# Patient Record
Sex: FEMALE | Race: BLACK OR AFRICAN AMERICAN | Employment: OTHER | URBAN - METROPOLITAN AREA
[De-identification: names, ages, dates, MRNs, and addresses within clinical notes are randomized per-mention and may not be internally consistent; named-entity substitution may affect disease eponyms.]

---

## 2017-01-25 DIAGNOSIS — R06.02 SOB (SHORTNESS OF BREATH): ICD-10-CM

## 2017-01-25 RX ORDER — SPIRONOLACTONE 25 MG/1
TABLET ORAL
Qty: 90 TAB | Refills: 3 | Status: SHIPPED | OUTPATIENT
Start: 2017-01-25 | End: 2018-02-21 | Stop reason: SDUPTHER

## 2017-03-06 ENCOUNTER — OFFICE VISIT (OUTPATIENT)
Dept: CARDIOLOGY CLINIC | Age: 82
End: 2017-03-06

## 2017-03-06 ENCOUNTER — CLINICAL SUPPORT (OUTPATIENT)
Dept: CARDIOLOGY CLINIC | Age: 82
End: 2017-03-06

## 2017-03-06 VITALS
HEIGHT: 62 IN | HEART RATE: 72 BPM | SYSTOLIC BLOOD PRESSURE: 112 MMHG | DIASTOLIC BLOOD PRESSURE: 70 MMHG | OXYGEN SATURATION: 91 %

## 2017-03-06 DIAGNOSIS — Z95.810 S/P ICD (INTERNAL CARDIAC DEFIBRILLATOR) PROCEDURE: ICD-10-CM

## 2017-03-06 DIAGNOSIS — I42.0 DILATED CARDIOMYOPATHY (HCC): Primary | ICD-10-CM

## 2017-03-06 DIAGNOSIS — I05.9 MITRAL VALVE DISORDER: ICD-10-CM

## 2017-03-06 NOTE — PROGRESS NOTES
1. Have you been to the ER, urgent care clinic since your last visit? Hospitalized since your last visit? No     2. Have you seen or consulted any other health care providers outside of the [address] [place] [address] since your last visit? Include any pap smears or colon screening.  No

## 2017-03-06 NOTE — PROGRESS NOTES
1. Have you been to the ER, urgent care clinic since your last visit? Hospitalized since your last visit? no  2. Have you seen or consulted any other health care providers outside of the [address] [place] [address] since your last visit? Include any pap smears or colon screening.  no

## 2017-03-06 NOTE — PROGRESS NOTES
HISTORY OF PRESENT ILLNESS  Cyril Lewis is a 80 y.o. female. HPI  She looks frail and aged now. She states that she does not feel well, but has no particular complaints. She is slightly slow to answer questions. She has had no chest pain. She denies dyspnea, orthopnea or PND. She has not been ambulating. She does have issues with leg edema. She is unable to get up from the wheelchair. She has had no palpitations, dizziness or syncope. She has had no symptoms to indicate TIA or amaurosis fugax. The interrogation of her ICD demonstrated normal OptiVol - indicating normal fluid status without recurrent volume overload. There is no significant tachyarrhythmia. Her H & H were 8.9 and 27.3, respectively, on 07/04/2016 with normal indices. Her BUN/creatinine were 21 and 1.10 on 08/08/2016. She has a history of a dilated cardiomyopathy with EF in the range of 25% by echocardiogram. She also has moderate to severe mitral regurgitation and pulmonary hypertension documented by echocardiogram in October 2001. The possibility of ischemic heart disease was entertained because of increased risk factors for coronary artery disease, but her thallium myocardial scanning in January 2002 revealed no evidence of ischemia, and it was felt that her cardiomyopathy was a nonischemic-type cardiomyopathy. She has been on a beta blocker, ACE inhibitor, and digoxin and has done well, with no symptoms. A repeat echocardiogram in March 2006 revealed diminished LV function with EF less than 25%. The mitral regurgitation remains to be mildly to moderately severe. She underwent ICD implantation on June 8, 2006, at which time it was intended to place biventricular leads for resynchronization therapy, particularly in light of her wide QRS complex. Unfortunately, after completion of the procedure, the left ventricular lead was noted to have dislodged; therefore, it was removed.  She did receive the device capable of cardiac resynchronization therapy for the future. She ultimately had implantation of the left ventricular lead on November 11, 2009, along with the generator change. She was admitted to [facility] on 07/04/16 with urosepsis and metabolic encephalopathy. She had a repeat echocardiogram on 07/01/16, which demonstrated severe LV dysfunction with EF in the 20% range. PA pressure was estimated at 41 mmHg. The left atrium was severely dilated and the mitral valve showed moderate regurgitation. Review of Systems   Constitutional: Negative for malaise/fatigue and weight loss. HENT: Positive for hearing loss. Eyes: Negative for blurred vision and double vision. Respiratory: Negative for shortness of breath. Cardiovascular: Positive for leg swelling. Negative for chest pain, palpitations, orthopnea, claudication and PND. Gastrointestinal: Negative for blood in stool, heartburn and melena. Genitourinary: Negative for dysuria, frequency, hematuria and urgency. Musculoskeletal: Positive for back pain and joint pain. Skin: Negative for itching and rash. Neurological: Negative for dizziness, loss of consciousness, weakness and headaches. Psychiatric/Behavioral: Negative for depression and memory loss. Physical Exam   Constitutional: She is oriented to person, place, and time. She appears well-developed and well-nourished. HENT:   Head: Normocephalic and atraumatic. Eyes: Conjunctivae are normal. Pupils are equal, round, and reactive to light. Neck: Normal range of motion. Neck supple. No JVD present. Cardiovascular: Normal rate, regular rhythm, S1 normal and S2 normal.   No extrasystoles are present. PMI is not displaced. Exam reveals no gallop and no friction rub. Murmur heard.   High-pitched blowing early systolic murmur is present with a grade of 1/6  at the apex  Pulses:       Carotid pulses are 3+ on the right side, and 3+ on the left side.  Pulmonary/Chest: Effort normal. She has no rales. Abdominal: Soft. There is no tenderness. Musculoskeletal: She exhibits edema. trace   Neurological: She is alert and oriented to person, place, and time. No cranial nerve deficit. Skin: Skin is warm and dry. Psychiatric: She has a normal mood and affect. Her behavior is normal.     Visit Vitals    /70    Pulse 72    Ht 5' 2\" (1.575 m)    SpO2 91%       Past Medical History:   Diagnosis Date    Bursitis of both shoulders     Cardiac echocardiogram, abnormal 07/01/2016    Definity enhanced. EF 20% (prev 35% on study of 9/5/12). Severe diffuse hypk. No mass. RVSP 41 mmHg. Severe LAE. Mod MR. Mod TR.  Cardiac thallium stress test 01/07/2002    Mild anteroseptal scar w/o ongoing ischemia. Appearance of dilated cardiomyopathy. LVE w/stress & rest.      Contact dermatitis and other eczema, due to unspecified cause     Dilated cardiomyopathy ([address]     Goiter     or thyroid mass    Heart attack ([address]     Hypercholesterolemia     Hypertension     Mitral regurgitation     Mitral valve disorders     Osteoarthritis of both knees     Pacemaker     S/P ICD (internal cardiac defibrillator) procedure     SOB (shortness of breath)     Thyroid disorder     Wears glasses        Social History     Social History    Marital status:      Spouse name: N/A    Number of children: N/A    Years of education: N/A     Occupational History    Not on file.      Social History Main Topics    Smoking status: Former Smoker     Packs/day: 1.00     Quit date: 6/7/1998    Smokeless tobacco: Never Used    Alcohol use No    Drug use: No    Sexual activity: Not on file     Other Topics Concern    Not on file     Social History Narrative       Family History   Problem Relation Age of Onset    Diabetes Other     Heart Disease Other     Hypertension Other     Arthritis-osteo Other        Past Surgical History:   Procedure Laterality Date    HX PACEMAKER  6/2006    ICD implantation    HX PACEMAKER  11/09    Upgrade of ICD to biventricular ICD, with left lead implantation and generator change.  INITIAL ICD GENERATOR/LEAD EVAL         Current Outpatient Prescriptions   Medication Sig Dispense Refill    spironolactone (ALDACTONE) 25 mg tablet TAKE ONE TABLET BY MOUTH ONCE DAILY 90 Tab 3    LORazepam (ATIVAN) 0.5 mg tablet Take 1 Tab by mouth every four (4) hours as needed for Anxiety. Max Daily Amount: 3 mg. 15 Tab 0    atorvastatin (LIPITOR) 20 mg tablet Take  by mouth daily.  acetaminophen (TYLENOL) 325 mg tablet Take  by mouth every six (6) hours as needed for Pain.  digoxin (LANOXIN) 0.125 mg tablet Take 1 Tab by mouth daily. 30 Tab 11    LORATADINE/PSEUDOEPHEDRINE SUL (CLARITIN-D 24 HOUR PO) Take  by mouth as needed.  METOPROLOL SUCCINATE (TOPROL XL PO) Take 100 mg by mouth daily.  FUROSEMIDE (LASIX PO) Take 40 mg by mouth daily.  LEVOTHYROXINE SODIUM (SYNTHROID PO) Take 100 mcg by mouth daily. EKG: unchanged from previous tracings, 1:1 Biv.pacing  . ASSESSMENT and PLAN  Encounter Diagnoses   Name Primary?  Dilated cardiomyopathy, EF 35%. Yes    Mitral valve disorder,secondary MR     ICD implantation in June 2006/upgrade with left ventricular lead placement and generator change in November 2009. Cardiac wise, she appears to be stable with no signs of cardiac decompensation and her OptiVol is down to normal - indicating normal volume status. She has had no significant tachyarrhythmia. Her secondary mitral regurgitation remains to be mild. She is now quite frail and shows her age. She is not able to ambulate. She does have quite significant anemia and her daughter was advised to discuss with her primary care physician, Dr. Sarthak Vergara, for further evaluation and treatment.

## 2017-03-06 NOTE — MR AVS SNAPSHOT
Visit Information Date & Time Provider Department Dept. Phone Encounter #  
 3/6/2017  1:20 PM Geneva Burns MD [facility] [address] [id number] Upcoming Health Maintenance Date Due DTaP/Tdap/Td series (1 - Tdap) 12/13/1953 ZOSTER VACCINE AGE 60> 12/13/1992 GLAUCOMA SCREENING Q2Y 12/13/1997 OSTEOPOROSIS SCREENING (DEXA) 12/13/1997 Pneumococcal 65+ Low/Medium Risk (1 of 2 - PCV13) 12/13/1997 MEDICARE YEARLY EXAM 12/13/1997 INFLUENZA AGE 9 TO ADULT 8/1/2016 Allergies as of 3/6/2017  Review Complete On: 3/6/2017 By: Geneva Burns MD  
  
 Severity Noted Reaction Type Reactions Phenobarbital    Unknown (comments) Current Immunizations  Never Reviewed No immunizations on file. Not reviewed this visit You Were Diagnosed With   
  
 Codes Comments [facility]ilated [facility] ([facility]    -  Primary ICD-10-CM: I42.0 ICD-9-CM: 425.4 Mitral valve disorder     ICD-10-CM: I05.9 ICD-9-CM: 424.0 AICD at end of battery life     ICD-10-CM: Z45.02 
ICD-9-CM: V53.32 Vitals BP Pulse Height(growth percentile) SpO2 Smoking Status 112/70 72 5' 2\" (1.575 m) 91% Former Smoker Vitals History Preferred Pharmacy Pharmacy Name Phone [facility] [address] [address] [phone] Your Updated Medication List  
  
   
This list is accurate as of: 3/6/17  2:07 PM.  Always use your most recent med list.  
  
  
  
  
 acetaminophen 325 mg tablet Commonly known as:  TYLENOL Take  by mouth every six (6) hours as needed for Pain. atorvastatin 20 mg tablet Commonly known as:  LIPITOR Take  by mouth daily. CLARITIN-D 24 HOUR PO Take  by mouth as needed. digoxin 0.125 mg tablet Commonly known as:  LANOXIN Take 1 Tab by mouth daily. LASIX PO Take 40 mg by mouth daily. LORazepam 0.5 mg tablet Commonly known as:  ATIVAN  
 Take 1 Tab by mouth every four (4) hours as needed for Anxiety. Max Daily Amount: 3 mg.  
  
 spironolactone 25 mg tablet Commonly known as:  ALDACTONE  
TAKE ONE TABLET BY MOUTH ONCE DAILY  
  
 SYNTHROID PO Take 100 mcg by mouth daily. TOPROL XL PO Take 100 mg by mouth daily. We Performed the Following AMB POC EKG ROUTINE W/ 12 LEADS, INTER & REP [13997 CPT(R)] Please provide this summary of care documentation to your next provider. Your primary care clinician is listed as Russel Parrish. If you have any questions after today's visit, please call [phone].

## 2017-03-07 NOTE — PROGRESS NOTES
I have personally seen and evaluated the device findings. Interrogation reviewed and I agree with assessment.     Roxi Lung

## 2017-11-15 ENCOUNTER — CLINICAL SUPPORT (OUTPATIENT)
Dept: CARDIOLOGY CLINIC | Age: 82
End: 2017-11-15

## 2017-11-15 ENCOUNTER — OFFICE VISIT (OUTPATIENT)
Dept: CARDIOLOGY CLINIC | Age: 82
End: 2017-11-15

## 2017-11-15 VITALS
HEART RATE: 73 BPM | HEIGHT: 62 IN | SYSTOLIC BLOOD PRESSURE: 118 MMHG | OXYGEN SATURATION: 93 % | DIASTOLIC BLOOD PRESSURE: 60 MMHG

## 2017-11-15 DIAGNOSIS — Z45.02 AICD AT END OF BATTERY LIFE: ICD-10-CM

## 2017-11-15 DIAGNOSIS — I42.0 DILATED CARDIOMYOPATHY (HCC): Primary | ICD-10-CM

## 2017-11-15 DIAGNOSIS — Z95.810 S/P ICD (INTERNAL CARDIAC DEFIBRILLATOR) PROCEDURE: ICD-10-CM

## 2017-11-15 DIAGNOSIS — I05.9 MITRAL VALVE DISORDER: ICD-10-CM

## 2017-11-15 NOTE — PROGRESS NOTES
HISTORY OF PRESENT ILLNESS  Jagdish Baxter is a 80 y.o. female. HPI  She has been feeling quite well. She has had no dyspnea, orthopnea or PND. She denies chest pain. She has had no palpitations, dizziness or syncope. She has had no ICD discharge. She has had no symptoms to indicate TIA or amaurosis fugax. She does have mild intermittent leg edema in the late afternoon usually. She has a history of a dilated cardiomyopathy with EF in the range of 25% by echocardiogram. She also has moderate to severe mitral regurgitation and pulmonary hypertension documented by echocardiogram in October 2001. The possibility of ischemic heart disease was entertained because of increased risk factors for coronary artery disease, but her thallium myocardial scanning in January 2002 revealed no evidence of ischemia, and it was felt that her cardiomyopathy was a nonischemic-type cardiomyopathy. She has been on a beta blocker, ACE inhibitor, and digoxin and has done well, with no symptoms. A repeat echocardiogram in March 2006 revealed diminished LV function with EF less than 25%. The mitral regurgitation remains to be mildly to moderately severe. She underwent ICD implantation on June 8, 2006, at which time it was intended to place biventricular leads for resynchronization therapy, particularly in light of her wide QRS complex. Unfortunately, after completion of the procedure, the left ventricular lead was noted to have dislodged; therefore, it was removed. She did receive the device capable of cardiac resynchronization therapy for the future. She ultimately had implantation of the left ventricular lead on November 11, 2009, along with the generator change. She was admitted to [facility] on 07/04/16 with urosepsis and metabolic encephalopathy. She had a repeat echocardiogram on 07/01/16, which demonstrated severe LV dysfunction with EF in the 20% range.  PA pressure was estimated at 41 mmHg. The left atrium was severely dilated and the mitral valve showed moderate regurgitation. Review of Systems   Constitutional: Negative for malaise/fatigue and weight loss. HENT: Positive for hearing loss. Eyes: Negative for blurred vision and double vision. Respiratory: Positive for shortness of breath. Cardiovascular: Positive for leg swelling. Negative for chest pain, palpitations, orthopnea, claudication and PND. Gastrointestinal: Negative for blood in stool, heartburn and melena. Genitourinary: Negative for dysuria, frequency, hematuria and urgency. Musculoskeletal: Positive for back pain and joint pain. Skin: Negative for itching and rash. Neurological: Negative for dizziness, loss of consciousness and weakness. Psychiatric/Behavioral: Negative for depression and memory loss. Physical Exam   Constitutional: She is oriented to person, place, and time. She appears well-developed and well-nourished. HENT:   Head: Normocephalic and atraumatic. Eyes: Conjunctivae are normal. Pupils are equal, round, and reactive to light. Neck: Normal range of motion. Neck supple. No JVD present. Cardiovascular: Normal rate, regular rhythm, S1 normal and S2 normal.   No extrasystoles are present. PMI is not displaced. Exam reveals no gallop and no friction rub. Murmur heard. High-pitched blowing early systolic murmur is present with a grade of 1/6  at the apex  Pulses:       Carotid pulses are 3+ on the right side, and 3+ on the left side. Pulmonary/Chest: Effort normal. She has no rales. Abdominal: Soft. There is no tenderness. Musculoskeletal: She exhibits edema. Neurological: She is alert and oriented to person, place, and time. No cranial nerve deficit. Skin: Skin is warm and dry. Psychiatric: She has a normal mood and affect.  Her behavior is normal.     Visit Vitals    /60    Pulse 73    Ht 5' 2\" (1.575 m)    SpO2 93%       Past Medical History:   Diagnosis Date    Bursitis of both shoulders     Cardiac echocardiogram, abnormal 07/01/2016    Definity enhanced. EF 20% (prev 35% on study of 9/5/12). Severe diffuse hypk. No mass. RVSP 41 mmHg. Severe LAE. Mod MR. Mod TR.  Cardiac thallium stress test 01/07/2002    Mild anteroseptal scar w/o ongoing ischemia. Appearance of dilated cardiomyopathy. LVE w/stress & rest.      Contact dermatitis and other eczema, due to unspecified cause     Dilated cardiomyopathy ([address]     Goiter     or thyroid mass    Heart attack     Hypercholesterolemia     Hypertension     Mitral regurgitation     Mitral valve disorders(424.0)     Osteoarthritis of both knees     Pacemaker     S/P ICD (internal cardiac defibrillator) procedure     SOB (shortness of breath)     Thyroid disorder     Wears glasses        Social History     Social History    Marital status:      Spouse name: N/A    Number of children: N/A    Years of education: N/A     Occupational History    Not on file. Social History Main Topics    Smoking status: Former Smoker     Packs/day: 1.00     Quit date: 6/7/1998    Smokeless tobacco: Never Used    Alcohol use No    Drug use: No    Sexual activity: Not on file     Other Topics Concern    Not on file     Social History Narrative       Family History   Problem Relation Age of Onset    Diabetes Other     Heart Disease Other     Hypertension Other     Arthritis-osteo Other        Past Surgical History:   Procedure Laterality Date    HX PACEMAKER  6/2006    ICD implantation    HX PACEMAKER  11/09    Upgrade of ICD to biventricular ICD, with left lead implantation and generator change.  INITIAL ICD GENERATOR/LEAD EVAL         Current Outpatient Prescriptions   Medication Sig Dispense Refill    spironolactone (ALDACTONE) 25 mg tablet TAKE ONE TABLET BY MOUTH ONCE DAILY 90 Tab 3    LORazepam (ATIVAN) 0.5 mg tablet Take 1 Tab by mouth every four (4) hours as needed for Anxiety. Max Daily Amount: 3 mg. 15 Tab 0    atorvastatin (LIPITOR) 20 mg tablet Take  by mouth daily.  acetaminophen (TYLENOL) 325 mg tablet Take  by mouth every six (6) hours as needed for Pain.  digoxin (LANOXIN) 0.125 mg tablet Take 1 Tab by mouth daily. 30 Tab 11    LORATADINE/PSEUDOEPHEDRINE SUL (CLARITIN-D 24 HOUR PO) Take  by mouth as needed.  METOPROLOL SUCCINATE (TOPROL XL PO) Take 100 mg by mouth daily.  FUROSEMIDE (LASIX PO) Take 40 mg by mouth daily.  LEVOTHYROXINE SODIUM (SYNTHROID PO) Take 100 mcg by mouth daily. EKG: unchanged from previous tracings, 1:1 Biv.pacing  . ASSESSMENT and PLAN  Encounter Diagnoses   Name Primary?  Dilated cardiomyopathy, EF 35%. Yes    Mitral valve disorder,secondary MR     ICD implantation in June 2006/upgrade with left ventricular lead placement and generator change in November 2009. She has been doing quite well. She shows no signs of cardiac decompensation and interrogation of her ICD demonstrated normal OptiVol - indicating normal volume status. There appears to be rapid consumption of the generator battery most likely secondary to impedance issue and currently, there is approximately 1.6 years left in the battery. She does have mild, second degree mitral regurgitation, which has not changed much. For now, she will be continued on the current medical regimen.

## 2017-11-15 NOTE — PATIENT INSTRUCTIONS
Continue current medications.    If you have any further questions or concerns, please contact our office. [phone]

## 2017-11-15 NOTE — MR AVS SNAPSHOT
Visit Information Date & Time Provider Department Dept. Phone Encounter #  
 11/15/2017 10:20 AM Lata Jackson MD [facility] [phone] [id number] Follow-up Instructions Follow-up and Disposition History Your Appointments 11/30/2017 11:30 AM  
PROCEDURE with Pacer Hv Csi [facility] Appt Note: medtronic debfib check thurs nov 300 [address] [id number] [address] [address] [phone] 230[address] [facility] [address] [address]  
  
    
 5/4/2018  2:20 PM  
Follow Up with Kofi Clark MD  
[facility] ([facility] Appt Note: 6 month follow up [place] [address] [address] [address] [phone] 23[address] [facility] [address] Upcoming Health Maintenance Date Due DTaP/Tdap/Td series (1 - Tdap) 12/13/1953 ZOSTER VACCINE AGE 60> 10/13/1992 GLAUCOMA SCREENING Q2Y 12/13/1997 OSTEOPOROSIS SCREENING (DEXA) 12/13/1997 Pneumococcal 65+ Low/Medium Risk (1 of 2 - PCV13) 12/13/1997 MEDICARE YEARLY EXAM 12/13/1997 Influenza Age 5 to Adult 8/1/2017 Allergies as of 11/15/2017  Review Complete On: 11/15/2017 By: Lata Jackson MD  
  
 Severity Noted Reaction Type Reactions Phenobarbital    Unknown (comments) Current Immunizations  Never Reviewed No immunizations on file. Not reviewed this visit You Were Diagnosed With   
  
 Codes Comments Dilated cardiomyopathy ([facility]    -  Primary ICD-10-CM: I42.0 ICD-9-CM: 425.4 Mitral valve disorder     ICD-10-CM: I05.9 ICD-9-CM: 394.9 S/P ICD (internal cardiac defibrillator) procedure     ICD-10-CM: Z95.810 ICD-9-CM: V45.02 Vitals BP Pulse Height(growth percentile) SpO2 Smoking Status 118/60 73 5' 2\" (1.575 m) 93% Former Smoker Vitals History Preferred Pharmacy Pharmacy Name Phone [facility] [address] [phone] Your Updated Medication List  
  
   
This list is accurate as of: 11/15/17 11:14 AM.  Always use your most recent med list.  
  
  
  
  
 acetaminophen 325 mg tablet Commonly known as:  TYLENOL Take  by mouth every six (6) hours as needed for Pain. atorvastatin 20 mg tablet Commonly known as:  LIPITOR Take  by mouth daily. CLARITIN-D 24 HOUR PO Take  by mouth as needed. digoxin 0.125 mg tablet Commonly known as:  LANOXIN Take 1 Tab by mouth daily. LASIX PO Take 40 mg by mouth daily. LORazepam 0.5 mg tablet Commonly known as:  ATIVAN Take 1 Tab by mouth every four (4) hours as needed for Anxiety. Max Daily Amount: 3 mg.  
  
 spironolactone 25 mg tablet Commonly known as:  ALDACTONE  
TAKE ONE TABLET BY MOUTH ONCE DAILY  
  
 SYNTHROID PO Take 100 mcg by mouth daily. TOPROL XL PO Take 100 mg by mouth daily. We Performed the Following AMB POC EKG ROUTINE W/ 12 LEADS, INTER & REP [94323 CPT(R)] Patient Instructions Continue current medications. If you have any further questions or concerns, please contact our office. [phone] Introducing [facility]! Dear Ronald Elise: Thank you for requesting a [employer] account. Our records indicate that you have previously registered for a [employer] account but its currently inactive. Please call our [employer] support line at [phone]. Additional Information If you have questions, please visit the Frequently Asked Questions section of the [employer] website at [email or web address] Remember, [employer] is NOT to be used for urgent needs. For medical emergencies, dial 911. Now available from your iPhone and Android! Please provide this summary of care documentation to your next provider. Your primary care clinician is listed as Holly Pantoja. If you have any questions after today's visit, please call [phone].

## 2017-11-15 NOTE — PROGRESS NOTES
1. Have you been to the ER, urgent care clinic since your last visit? Hospitalized since your last visit? no  2. Have you seen or consulted any other health care providers outside of the [address] [place] [address] since your last visit? Include any pap smears or colon screening.   no

## 2017-11-15 NOTE — PROGRESS NOTES
I have personally seen and evaluated the device findings. Interrogation reviewed and I agree with assessment.     Phani Verdugo

## 2017-11-30 ENCOUNTER — CLINICAL SUPPORT (OUTPATIENT)
Dept: CARDIOLOGY CLINIC | Age: 82
End: 2017-11-30

## 2017-11-30 DIAGNOSIS — Z95.810 S/P ICD (INTERNAL CARDIAC DEFIBRILLATOR) PROCEDURE: ICD-10-CM

## 2017-11-30 DIAGNOSIS — I42.0 DILATED CARDIOMYOPATHY (HCC): Primary | ICD-10-CM

## 2017-12-01 NOTE — PROGRESS NOTES
I have personally seen and evaluated the device findings. Interrogation reviewed and I agree with assessment.     Inocencia Quiñones

## 2018-02-21 DIAGNOSIS — R06.02 SOB (SHORTNESS OF BREATH): ICD-10-CM

## 2018-02-22 RX ORDER — SPIRONOLACTONE 25 MG/1
TABLET ORAL
Qty: 90 TAB | Refills: 3 | OUTPATIENT
Start: 2018-02-22 | End: 2019-03-24 | Stop reason: SDUPTHER

## 2018-03-28 ENCOUNTER — TELEPHONE (OUTPATIENT)
Dept: CARDIOLOGY CLINIC | Age: 83
End: 2018-03-28

## 2018-03-28 NOTE — TELEPHONE ENCOUNTER
Patient states that they were informed that something was off with the leads on her cardiac device. Scheduled for tomorrow.

## 2018-05-02 ENCOUNTER — APPOINTMENT (OUTPATIENT)
Dept: GENERAL RADIOLOGY | Age: 83
End: 2018-05-02
Attending: EMERGENCY MEDICINE
Payer: COMMERCIAL

## 2018-05-02 ENCOUNTER — HOSPITAL ENCOUNTER (EMERGENCY)
Age: 83
Discharge: HOME | End: 2018-05-02
Attending: EMERGENCY MEDICINE
Payer: COMMERCIAL

## 2018-05-02 VITALS
SYSTOLIC BLOOD PRESSURE: 129 MMHG | BODY MASS INDEX: 24.16 KG/M2 | RESPIRATION RATE: 22 BRPM | TEMPERATURE: 98.9 F | HEIGHT: 65 IN | HEART RATE: 79 BPM | OXYGEN SATURATION: 99 % | DIASTOLIC BLOOD PRESSURE: 55 MMHG | WEIGHT: 145 LBS

## 2018-05-02 DIAGNOSIS — R53.1 WEAKNESS: Primary | ICD-10-CM

## 2018-05-02 LAB
ALBUMIN SERPL-MCNC: 3.6 G/DL (ref 3.4–5)
ALBUMIN/GLOB SERPL: 0.7 {RATIO} (ref 0.8–1.7)
ALP SERPL-CCNC: 102 U/L (ref 45–117)
ALT SERPL-CCNC: 22 U/L (ref 13–56)
ANION GAP BLD CALC-SCNC: 17 MMOL/L (ref 10–20)
ANION GAP SERPL CALC-SCNC: 8 MMOL/L (ref 3–18)
APPEARANCE UR: CLEAR
AST SERPL-CCNC: 21 U/L (ref 15–37)
ATRIAL RATE: 85 BPM
BACTERIA URNS QL MICRO: ABNORMAL /HPF
BASOPHILS # BLD: 0 K/UL (ref 0–0.06)
BASOPHILS NFR BLD: 0 % (ref 0–2)
BILIRUB SERPL-MCNC: 0.4 MG/DL (ref 0.2–1)
BILIRUB UR QL: NEGATIVE
BUN BLD-MCNC: 19 MG/DL (ref 7–18)
BUN SERPL-MCNC: 16 MG/DL (ref 7–18)
BUN/CREAT SERPL: 14 (ref 12–20)
CA-I BLD-MCNC: 1.14 MMOL/L (ref 1.12–1.32)
CALCIUM SERPL-MCNC: 9 MG/DL (ref 8.5–10.1)
CALCULATED P AXIS, ECG09: 100 DEGREES
CALCULATED R AXIS, ECG10: -39 DEGREES
CALCULATED T AXIS, ECG11: 79 DEGREES
CHLORIDE BLD-SCNC: 98 MMOL/L (ref 100–108)
CHLORIDE SERPL-SCNC: 99 MMOL/L (ref 100–108)
CK MB CFR SERPL CALC: ABNORMAL % (ref 0–4)
CK MB SERPL-MCNC: <1 NG/ML (ref 5–25)
CK SERPL-CCNC: 59 U/L (ref 26–192)
CO2 BLD-SCNC: 27 MMOL/L (ref 19–24)
CO2 SERPL-SCNC: 27 MMOL/L (ref 21–32)
COLOR UR: YELLOW
CREAT SERPL-MCNC: 1.17 MG/DL (ref 0.6–1.3)
CREAT UR-MCNC: 1.1 MG/DL (ref 0.6–1.3)
DIAGNOSIS, 93000: NORMAL
DIFFERENTIAL METHOD BLD: ABNORMAL
EOSINOPHIL # BLD: 0.1 K/UL (ref 0–0.4)
EOSINOPHIL NFR BLD: 1 % (ref 0–5)
EPITH CASTS URNS QL MICRO: ABNORMAL /LPF (ref 0–5)
ERYTHROCYTE [DISTWIDTH] IN BLOOD BY AUTOMATED COUNT: 15 % (ref 11.6–14.5)
GLOBULIN SER CALC-MCNC: 5.3 G/DL (ref 2–4)
GLUCOSE BLD STRIP.AUTO-MCNC: 107 MG/DL (ref 74–106)
GLUCOSE SERPL-MCNC: 102 MG/DL (ref 74–99)
GLUCOSE UR STRIP.AUTO-MCNC: NEGATIVE MG/DL
HCT VFR BLD AUTO: 39.4 % (ref 35–45)
HCT VFR BLD CALC: 45 % (ref 36–49)
HGB BLD-MCNC: 13.4 G/DL (ref 12–16)
HGB BLD-MCNC: 15.3 G/DL (ref 12–16)
HGB UR QL STRIP: NEGATIVE
KETONES UR QL STRIP.AUTO: NEGATIVE MG/DL
LACTATE BLD-SCNC: 1.6 MMOL/L (ref 0.4–2)
LEUKOCYTE ESTERASE UR QL STRIP.AUTO: ABNORMAL
LYMPHOCYTES # BLD: 1.6 K/UL (ref 0.9–3.6)
LYMPHOCYTES NFR BLD: 19 % (ref 21–52)
MCH RBC QN AUTO: 29.4 PG (ref 24–34)
MCHC RBC AUTO-ENTMCNC: 34 G/DL (ref 31–37)
MCV RBC AUTO: 86.4 FL (ref 74–97)
MONOCYTES # BLD: 0.9 K/UL (ref 0.05–1.2)
MONOCYTES NFR BLD: 11 % (ref 3–10)
NEUTS SEG # BLD: 5.8 K/UL (ref 1.8–8)
NEUTS SEG NFR BLD: 69 % (ref 40–73)
NITRITE UR QL STRIP.AUTO: NEGATIVE
P-R INTERVAL, ECG05: 152 MS
PH UR STRIP: 5.5 [PH] (ref 5–8)
PLATELET # BLD AUTO: 202 K/UL (ref 135–420)
PMV BLD AUTO: 11.6 FL (ref 9.2–11.8)
POTASSIUM BLD-SCNC: 4.2 MMOL/L (ref 3.5–5.5)
POTASSIUM SERPL-SCNC: 4 MMOL/L (ref 3.5–5.5)
PROT SERPL-MCNC: 8.9 G/DL (ref 6.4–8.2)
PROT UR STRIP-MCNC: NEGATIVE MG/DL
Q-T INTERVAL, ECG07: 410 MS
QRS DURATION, ECG06: 182 MS
QTC CALCULATION (BEZET), ECG08: 487 MS
RBC # BLD AUTO: 4.56 M/UL (ref 4.2–5.3)
RBC #/AREA URNS HPF: ABNORMAL /HPF (ref 0–5)
SODIUM BLD-SCNC: 136 MMOL/L (ref 136–145)
SODIUM SERPL-SCNC: 134 MMOL/L (ref 136–145)
SP GR UR REFRACTOMETRY: 1.01 (ref 1–1.03)
TROPONIN I BLD-MCNC: <0.04 NG/ML (ref 0–0.08)
TROPONIN I SERPL-MCNC: 0.08 NG/ML (ref 0–0.04)
UROBILINOGEN UR QL STRIP.AUTO: 0.2 EU/DL (ref 0.2–1)
VENTRICULAR RATE, ECG03: 85 BPM
WBC # BLD AUTO: 8.4 K/UL (ref 4.6–13.2)
WBC URNS QL MICRO: ABNORMAL /HPF (ref 0–4)

## 2018-05-02 PROCEDURE — 71045 X-RAY EXAM CHEST 1 VIEW: CPT

## 2018-05-02 PROCEDURE — 84484 ASSAY OF TROPONIN QUANT: CPT

## 2018-05-02 PROCEDURE — 93005 ELECTROCARDIOGRAM TRACING: CPT

## 2018-05-02 PROCEDURE — 82550 ASSAY OF CK (CPK): CPT | Performed by: EMERGENCY MEDICINE

## 2018-05-02 PROCEDURE — 83605 ASSAY OF LACTIC ACID: CPT

## 2018-05-02 PROCEDURE — 80053 COMPREHEN METABOLIC PANEL: CPT | Performed by: EMERGENCY MEDICINE

## 2018-05-02 PROCEDURE — 85025 COMPLETE CBC W/AUTO DIFF WBC: CPT | Performed by: EMERGENCY MEDICINE

## 2018-05-02 PROCEDURE — 81001 URINALYSIS AUTO W/SCOPE: CPT | Performed by: EMERGENCY MEDICINE

## 2018-05-02 PROCEDURE — 80047 BASIC METABLC PNL IONIZED CA: CPT

## 2018-05-02 PROCEDURE — 99285 EMERGENCY DEPT VISIT HI MDM: CPT

## 2018-05-02 RX ORDER — NITROFURANTOIN 25; 75 MG/1; MG/1
100 CAPSULE ORAL 2 TIMES DAILY
Qty: 6 CAP | Refills: 0 | Status: SHIPPED | OUTPATIENT
Start: 2018-05-02 | End: 2018-05-05

## 2018-05-02 NOTE — Clinical Note
Please take the antibiotic if your symptoms worsen and follow-up with your primary care doctor. Your evaluation today showed generalized weakness. Please follow up with a doctor as discussed. The evaluation and treatment done today requires that you fol low up with a physician for re-evaluation. Medical problems can change over time and symptoms can get worse or new symptoms can develop over time, therefore, it is important that you follow up as we discussed. Please immediately return to the ER if you  have any concerns. Call the ER if you have any questions about what we discussed. At the [facility] Emergency Department we are genuinely concerned about your health and comfort. You may be selected to participate in a patient satisfacti on survey mailed to your home. We are excited about the opportunity to learn from your experience so we may continue to improve. In striving for the very best we believe good is not good enough, but if you rate us as EXCELLENT in all boxes, we have succe eded. We strive to provide EXCELLENT care to you and your family. We appreciate the opportunity to take care of you, and hope you do well.

## 2018-05-02 NOTE — DISCHARGE INSTRUCTIONS
Weakness: Care Instructions  Your Care Instructions    Weakness is a lack of physical or muscle strength. You may feel that you need to make extra effort to move your arms, legs, or other muscles. Generalized weakness means that you feel weak in most areas of your body. Another type of weakness may affect just one muscle or group of muscles. You may feel weak and tired after you have done too much activity, such as taking an extra-long hike. This is not a serious problem. It often goes away on its own. Feeling weak can also be caused by medical conditions like thyroid problems, depression, or a virus. Sometimes the cause can be serious. Your doctor may want to do more tests to try to find the cause of the weakness. The doctor has checked you carefully, but problems can develop later. If you notice any problems or new symptoms, get medical treatment right away. Follow-up care is a key part of your treatment and safety. Be sure to make and go to all appointments, and call your doctor if you are having problems. It's also a good idea to know your test results and keep a list of the medicines you take. How can you care for yourself at home? · Rest when you feel tired. · Be safe with medicines. If your doctor prescribed medicine, take it exactly as prescribed. Call your doctor if you think you are having a problem with your medicine. You will get more details on the specific medicines your doctor prescribes. · Do not skip meals. Eating a balanced diet may increase your energy level. · Get some physical activity every day, but do not get too tired. When should you call for help? Call your doctor now or seek immediate medical care if:  ? · You have new or worse weakness. ? · You are dizzy or lightheaded, or you feel like you may faint. ? Watch closely for changes in your health, and be sure to contact your doctor if:  ? · You do not get better as expected. Where can you learn more?   Go to http://cody.info/. Enter [id number] in the search box to learn more about \"Weakness: Care Instructions. \"  Current as of: March 20, 2017  Content Version: 11.4  © 4180-5995 [employer]. Care instructions adapted under license by [employer] (which disclaims liability or warranty for this information). If you have questions about a medical condition or this instruction, always ask your healthcare professional. [address] any warranty or liability for your use of this information.

## 2018-05-02 NOTE — ED PROVIDER NOTES
EMERGENCY DEPARTMENT HISTORY AND PHYSICAL EXAM    2:37 PM      Date: 5/2/2018  Patient Name: Lena Crabtree    History of Presenting Illness     No chief complaint on file. History Provided By: Patient and Patient's Daughter    Chief Complaint: Generalized Body Weakness  Duration: Earlier Today   Timing:  Constant and Worsening  Location: N/A  Quality: N/A  Severity: Mild  Modifying Factors: None   Associated Symptoms: lethargic      Additional History (Context): Lena Crabtree is a 80 y.o. female with hx of HTN, CHF, MI, AICD, dilated cardiomyopathy and thyroid disorder presenting to the ED via EMS with c/o constant, worsening generalized body weakness that began earlier today. Hx is limited due to pt's mental status, secondary to dementia. Daughter at bedside assisting with hx. Daughter states pt has been \"weak, slow and not her sassy self\". States pt has not been able to move around [place] has no energy\". Denies any CP, SOB, fever, chills, abdominal pain, nausea, vomiting, diarrhea, blood in stool, urinary sx or changes in routine. Severity is mild. Notes pt has decrease her fluid intake, but has not missed any doses of her medication. No other sx or complaints given at this time. PCP: Lalo Salmon MD    Current Outpatient Prescriptions   Medication Sig Dispense Refill    nitrofurantoin, macrocrystal-monohydrate, (MACROBID) 100 mg capsule Take 1 Cap by mouth two (2) times a day for 3 days. 6 Cap 0    spironolactone (ALDACTONE) 25 mg tablet TAKE ONE TABLET BY MOUTH ONCE DAILY 90 Tab 3    LORazepam (ATIVAN) 0.5 mg tablet Take 1 Tab by mouth every four (4) hours as needed for Anxiety. Max Daily Amount: 3 mg. 15 Tab 0    atorvastatin (LIPITOR) 20 mg tablet Take  by mouth daily.  acetaminophen (TYLENOL) 325 mg tablet Take  by mouth every six (6) hours as needed for Pain.  digoxin (LANOXIN) 0.125 mg tablet Take 1 Tab by mouth daily.  30 Tab 11    LORATADINE/PSEUDOEPHEDRINE SUL (CLARITIN-D 24 HOUR PO) Take  by mouth as needed.  METOPROLOL SUCCINATE (TOPROL XL PO) Take 100 mg by mouth daily.  FUROSEMIDE (LASIX PO) Take 40 mg by mouth daily.  LEVOTHYROXINE SODIUM (SYNTHROID PO) Take 100 mcg by mouth daily. Past History     Past Medical History:  Past Medical History:   Diagnosis Date    Bursitis of both shoulders     Cardiac echocardiogram, abnormal 07/01/2016    Definity enhanced. EF 20% (prev 35% on study of 9/5/12). Severe diffuse hypk. No mass. RVSP 41 mmHg. Severe LAE. Mod MR. Mod TR.  Cardiac thallium stress test 01/07/2002    Mild anteroseptal scar w/o ongoing ischemia. Appearance of dilated cardiomyopathy. LVE w/stress & rest.      Contact dermatitis and other eczema, due to unspecified cause     Dilated cardiomyopathy ([address]     Goiter     or thyroid mass    Heart attack ([address]     Hypercholesterolemia     Hypertension     Mitral regurgitation     Mitral valve disorders(424.0)     Osteoarthritis of both knees     Pacemaker     S/P ICD (internal cardiac defibrillator) procedure     SOB (shortness of breath)     Thyroid disorder     Wears glasses        Past Surgical History:  Past Surgical History:   Procedure Laterality Date    HX PACEMAKER  6/2006    ICD implantation    HX PACEMAKER  11/09    Upgrade of ICD to biventricular ICD, with left lead implantation and generator change.  INITIAL ICD GENERATOR/LEAD EVAL         Family History:  Family History   Problem Relation Age of Onset    Diabetes Other     Heart Disease Other     Hypertension Other     Arthritis-osteo Other        Social History:  Social History   Substance Use Topics    Smoking status: Former Smoker     Packs/day: 1.00     Quit date: 6/7/1998    Smokeless tobacco: Never Used    Alcohol use No       Allergies:   Allergies   Allergen Reactions    Phenobarbital Unknown (comments)         Review of Systems       Review of Systems Unable to perform ROS: Dementia         Physical Exam     Patient Vitals for the past 12 hrs:   Temp Pulse Resp BP SpO2   05/02/18 1828 98.9 °F (37.2 °C) - - - -   05/02/18 1815 - 79 22 129/55 99 %   05/02/18 1645 - 81 21 91/60 100 %   05/02/18 1630 - 83 20 100/59 100 %   05/02/18 1615 - 84 21 121/87 100 %   05/02/18 1605 (!) 102.2 °F (39 °C) - - - -   05/02/18 1600 - 82 18 135/65 97 %   05/02/18 1545 - 85 18 130/63 100 %   05/02/18 1539 - - - - 96 %   05/02/18 1530 - 83 24 131/62 99 %   05/02/18 1515 - 84 17 119/80 100 %   05/02/18 1500 - 85 23 130/78 97 %   05/02/18 1430 - 84 24 120/58 96 %   05/02/18 1415 - 84 26 128/64 100 %   05/02/18 1413 100.1 °F (37.8 °C) 84 22 122/62 100 %           Physical Exam   Constitutional: She is oriented to person, place, and time. She appears well-developed. HENT:   Head: Normocephalic and atraumatic. Eyes: Conjunctivae and EOM are normal.   Neck: Normal range of motion. Cardiovascular: Normal heart sounds. Exam reveals no gallop and no friction rub. No murmur heard. Pulmonary/Chest: Effort normal and breath sounds normal. No stridor. Abdominal: Soft. There is no tenderness. Musculoskeletal: Normal range of motion. She exhibits no tenderness. Neurological: She is alert and oriented to person, place, and time. Skin: Skin is warm and dry. She is not diaphoretic. Psychiatric: She has a normal mood and affect. Her behavior is normal.   Nursing note and vitals reviewed.         Diagnostic Study Results     Labs -  Recent Results (from the past 12 hour(s))   EKG, 12 LEAD, INITIAL    Collection Time: 05/02/18  2:33 PM   Result Value Ref Range    Ventricular Rate 85 BPM    Atrial Rate 85 BPM    P-R Interval 152 ms    QRS Duration 182 ms    Q-T Interval 410 ms    QTC Calculation (Bezet) 487 ms    Calculated P Axis 100 degrees    Calculated R Axis -39 degrees    Calculated T Axis 79 degrees    Diagnosis       Atrial-sensed ventricular-paced rhythm  Abnormal ECG  When compared with ECG of 30-JUN-2016 15:41,  Vent. rate has decreased BY   8 BPM     POC TROPONIN-I    Collection Time: 05/02/18  2:51 PM   Result Value Ref Range    Troponin-I (POC) <0.04 0.00 - 0.08 ng/mL   CBC WITH AUTOMATED DIFF    Collection Time: 05/02/18  2:51 PM   Result Value Ref Range    WBC 8.4 4.6 - 13.2 K/uL    RBC 4.56 4.20 - 5.30 M/uL    HGB 13.4 12.0 - 16.0 g/dL    HCT 39.4 35.0 - 45.0 %    MCV 86.4 74.0 - 97.0 FL    MCH 29.4 24.0 - 34.0 PG    MCHC 34.0 31.0 - 37.0 g/dL    RDW 15.0 (H) 11.6 - 14.5 %    PLATELET 785 786 - 350 K/uL    MPV 11.6 9.2 - 11.8 FL    NEUTROPHILS 69 40 - 73 %    LYMPHOCYTES 19 (L) 21 - 52 %    MONOCYTES 11 (H) 3 - 10 %    EOSINOPHILS 1 0 - 5 %    BASOPHILS 0 0 - 2 %    ABS. NEUTROPHILS 5.8 1.8 - 8.0 K/UL    ABS. LYMPHOCYTES 1.6 0.9 - 3.6 K/UL    ABS. MONOCYTES 0.9 0.05 - 1.2 K/UL    ABS. EOSINOPHILS 0.1 0.0 - 0.4 K/UL    ABS. BASOPHILS 0.0 0.0 - 0.06 K/UL    DF AUTOMATED     METABOLIC PANEL, COMPREHENSIVE    Collection Time: 05/02/18  2:51 PM   Result Value Ref Range    Sodium 134 (L) 136 - 145 mmol/L    Potassium 4.0 3.5 - 5.5 mmol/L    Chloride 99 (L) 100 - 108 mmol/L    CO2 27 21 - 32 mmol/L    Anion gap 8 3.0 - 18 mmol/L    Glucose 102 (H) 74 - 99 mg/dL    BUN 16 7.0 - 18 MG/DL    Creatinine 1.17 0.6 - 1.3 MG/DL    BUN/Creatinine ratio 14 12 - 20      GFR est AA 53 (L) >60 ml/min/1.73m2    GFR est non-AA 44 (L) >60 ml/min/1.73m2    Calcium 9.0 8.5 - 10.1 MG/DL    Bilirubin, total 0.4 0.2 - 1.0 MG/DL    ALT (SGPT) 22 13 - 56 U/L    AST (SGOT) 21 15 - 37 U/L    Alk.  phosphatase 102 45 - 117 U/L    Protein, total 8.9 (H) 6.4 - 8.2 g/dL    Albumin 3.6 3.4 - 5.0 g/dL    Globulin 5.3 (H) 2.0 - 4.0 g/dL    A-G Ratio 0.7 (L) 0.8 - 1.7     CARDIAC PANEL,(CK, CKMB & TROPONIN)    Collection Time: 05/02/18  2:51 PM   Result Value Ref Range    CK 59 26 - 192 U/L    CK - MB <1.0 <3.6 ng/ml    CK-MB Index  0.0 - 4.0 %     CALCULATION NOT PERFORMED WHEN RESULT IS BELOW LINEAR LIMIT Troponin-I, Qt. 0.08 (H) 0.0 - 0.045 NG/ML   POC CHEM8    Collection Time: 05/02/18  2:53 PM   Result Value Ref Range    CO2, POC 27 (H) 19 - 24 MMOL/L    Glucose,  (H) 74 - 106 MG/DL    BUN, POC 19 (H) 7 - 18 MG/DL    Creatinine, POC 1.1 0.6 - 1.3 MG/DL    GFRAA, POC 57 (L) >60 ml/min/1.73m2    GFRNA, POC 47 (L) >60 ml/min/1.73m2    Sodium,  136 - 145 MMOL/L    Potassium, POC 4.2 3.5 - 5.5 MMOL/L    Calcium, ionized (POC) 1.14 1.12 - 1.32 mmol/L    Chloride, POC 98 (L) 100 - 108 MMOL/L    Anion gap, POC 17 10 - 20      Hematocrit, POC 45 36 - 49 %    Hemoglobin, POC 15.3 12 - 16 G/DL   POC LACTIC ACID    Collection Time: 05/02/18  2:57 PM   Result Value Ref Range    Lactic Acid (POC) 1.6 0.4 - 2.0 mmol/L   URINALYSIS W/ RFLX MICROSCOPIC    Collection Time: 05/02/18  4:00 PM   Result Value Ref Range    Color YELLOW      Appearance CLEAR      Specific gravity 1.008 1.005 - 1.030      pH (UA) 5.5 5.0 - 8.0      Protein NEGATIVE  NEG mg/dL    Glucose NEGATIVE  NEG mg/dL    Ketone NEGATIVE  NEG mg/dL    Bilirubin NEGATIVE  NEG      Blood NEGATIVE  NEG      Urobilinogen 0.2 0.2 - 1.0 EU/dL    Nitrites NEGATIVE  NEG      Leukocyte Esterase TRACE (A) NEG     URINE MICROSCOPIC ONLY    Collection Time: 05/02/18  4:00 PM   Result Value Ref Range    WBC 1 to 3 0 - 4 /hpf    RBC NONE 0 - 5 /hpf    Epithelial cells FEW 0 - 5 /lpf    Bacteria FEW (A) NEG /hpf       Radiologic Studies -   XR CHEST SNGL V   Impression:  Cardiomegaly. Low lung volumes. No definite evidence of active pulmonary disease or heart failure at this time. Other than decreased lung volumes, there is been no change since 7/1/2016. Medical Decision Making     Provider Notes (Medical Decision Making): Based on my history, physical exam, and diagnostic evaluation, the patient appears to have symptoms consistent with generalized weakness and UTI. Overall, the patient is in excellent condition. There is no distress.  The patient is non-toxic appearing. Appears comfortable. Chemistry and CBC as well as cxr are normal, but she had a borderline UA for UTI, so she will be discharged on abx for UTI. There is no evidence of electrolyte abnormality or infection. CVA is not suspected and detailed neurological exam is normal.  Discussed diagnosis and results with patient. Advised the patient to follow up with a doctor within 3 days for repeat evaluation. The pt understands what signs and symptoms require immediate return to the nearest ER. The patient had the opportunity to ask about any questions or concerns about the care and discharge instructions. The patient seems satisfied with the care and appears to understand the discharge instructions. I am the first provider for this patient. I reviewed the vital signs, available nursing notes, past medical history, past surgical history, family history and social history. Vital Signs-Reviewed the patient's vital signs. Pulse Oximetry Analysis -  100% on room air, stable     Cardiac Monitor:  Rate: 84  Rhythm:  Normal Sinus Rhythm     EKG: Interpreted by the EP. Time Interpreted: 14:33   Rate: 85   Rhythm: Atrial sensed ventricular paced    Interpretation: Non specific STs. QTc duration is 487 ms. Comparison: Unchanged compared to prior. Records Reviewed: Nursing Notes and Old Medical Records (Time of Review: 2:37 PM)    ED Course: Progress Notes, Reevaluation, and Consults:      Diagnosis     Clinical Impression:   1.  Weakness        Disposition: Discharge     Follow-up Information     Follow up With Details Comments Contact Info    Devon Roy MD Call in 2 days  [address] [facility] [place] Santizo      [facility] EMERGENCY DEPT  As needed, If symptoms worsen [address] [facility] [address]  [phone]           Discharge Medication List as of 5/2/2018  6:21 PM      START taking these medications    Details   nitrofurantoin, macrocrystal-monohydrate, (MACROBID) 100 mg capsule Take 1 Cap by mouth two (2) times a day for 3 days. , Print, Disp-6 Cap, R-0         CONTINUE these medications which have NOT CHANGED    Details   spironolactone (ALDACTONE) 25 mg tablet TAKE ONE TABLET BY MOUTH ONCE DAILY, Phone In, Disp-90 Tab, R-3      LORazepam (ATIVAN) 0.5 mg tablet Take 1 Tab by mouth every four (4) hours as needed for Anxiety. Max Daily Amount: 3 mg., Print, Disp-15 Tab, R-0      atorvastatin (LIPITOR) 20 mg tablet Take  by mouth daily. , Historical Med      acetaminophen (TYLENOL) 325 mg tablet Take  by mouth every six (6) hours as needed for Pain., Historical Med      digoxin (LANOXIN) 0.125 mg tablet Take 1 Tab by mouth daily. , Normal, Disp-30 Tab, R-11      LORATADINE/PSEUDOEPHEDRINE SUL (CLARITIN-D 24 HOUR PO) Take  by mouth as needed., Historical Med      METOPROLOL SUCCINATE (TOPROL XL PO) Take 100 mg by mouth daily. , Historical Med      FUROSEMIDE (LASIX PO) Take 40 mg by mouth daily. , Historical Med      LEVOTHYROXINE SODIUM (SYNTHROID PO) Take 100 mcg by mouth daily. , Historical Med           _______________________________    Attestations:  [address] [place] [address] acting as a scribe for and in the presence of Maximino Gonzales MD      May 02, 2018 at 2:37 PM       Provider Attestation:      I personally performed the services described in the documentation, reviewed the documentation, as recorded by the scribe in my presence, and it accurately and completely records my words and actions.  May 02, 2018 at 2:37 PM - Maximino Gonzales MD    _______________________________

## 2018-05-02 NOTE — ED TRIAGE NOTES
Patient arrived via EMS c/o generalized weakness. Patient daughter states she is not as energetic as she usually is. Patient hard of hearing. Patient needs wheelchair to ambulate.  Hx knee arthritis

## 2018-05-23 ENCOUNTER — OFFICE VISIT (OUTPATIENT)
Dept: CARDIOLOGY CLINIC | Age: 83
End: 2018-05-23

## 2018-05-23 VITALS
SYSTOLIC BLOOD PRESSURE: 118 MMHG | DIASTOLIC BLOOD PRESSURE: 68 MMHG | HEIGHT: 65 IN | OXYGEN SATURATION: 90 % | BODY MASS INDEX: 20.99 KG/M2 | WEIGHT: 126 LBS | HEART RATE: 63 BPM

## 2018-05-23 DIAGNOSIS — Z95.810 S/P ICD (INTERNAL CARDIAC DEFIBRILLATOR) PROCEDURE: ICD-10-CM

## 2018-05-23 DIAGNOSIS — I05.9 MITRAL VALVE DISORDER: ICD-10-CM

## 2018-05-23 DIAGNOSIS — I42.0 DILATED CARDIOMYOPATHY (HCC): Primary | ICD-10-CM

## 2018-05-23 NOTE — PROGRESS NOTES
HISTORY OF PRESENT ILLNESS  Silverio Burr is a 80 y.o. female. HPI  She looks very good today. She denies any cardiac symptoms. She denies chest pain, dyspnea, orthopnea or PND. She denies palpitation, dizziness or syncope. She denies any symptoms of TIA or amaurosis fugax. She has not been able to walk much because of the bilateral knee pain and has been told her knees are bone on bone. She has a history of a dilated cardiomyopathy with EF in the range of 25% by echocardiogram. She also has moderate to severe mitral regurgitation and pulmonary hypertension documented by echocardiogram in October 2001. The possibility of ischemic heart disease was entertained because of increased risk factors for coronary artery disease, but her thallium myocardial scanning in January 2002 revealed no evidence of ischemia, and it was felt that her cardiomyopathy was a nonischemic-type cardiomyopathy. She has been on a beta blocker, ACE inhibitor, and digoxin and has done well, with no symptoms. A repeat echocardiogram in March 2006 revealed diminished LV function with EF less than 25%. The mitral regurgitation remains to be mildly to moderately severe. She underwent ICD implantation on June 8, 2006, at which time it was intended to place biventricular leads for resynchronization therapy, particularly in light of her wide QRS complex. Unfortunately, after completion of the procedure, the left ventricular lead was noted to have dislodged; therefore, it was removed. She did receive the device capable of cardiac resynchronization therapy for the future. She ultimately had implantation of the left ventricular lead on November 11, 2009, along with the generator change. She was admitted to [facility] on 07/04/16 with urosepsis and metabolic encephalopathy. She had a repeat echocardiogram on 07/01/16, which demonstrated severe LV dysfunction with EF in the 20% range.  PA pressure was estimated at 41 mmHg. The left atrium was severely dilated and the mitral valve showed moderate regurgitation. Review of Systems   Constitutional: Positive for malaise/fatigue. Negative for weight loss. HENT: Negative for hearing loss. Eyes: Negative for blurred vision and double vision. Respiratory: Negative for shortness of breath. Cardiovascular: Negative for chest pain, palpitations, orthopnea, claudication, leg swelling and PND. Gastrointestinal: Negative for blood in stool, heartburn and melena. Genitourinary: Negative for dysuria, frequency, hematuria and urgency. Musculoskeletal: Positive for joint pain. Negative for back pain. Skin: Negative for itching and rash. Neurological: Negative for dizziness, loss of consciousness and weakness. Psychiatric/Behavioral: Negative for depression and memory loss. Physical Exam   Constitutional: She is oriented to person, place, and time. She appears well-developed and well-nourished. HENT:   Head: Normocephalic and atraumatic. Eyes: Conjunctivae are normal. Pupils are equal, round, and reactive to light. Neck: Normal range of motion. Neck supple. No JVD present. Cardiovascular: Normal rate, regular rhythm, S1 normal and S2 normal.   No extrasystoles are present. PMI is not displaced. Exam reveals no gallop and no friction rub. Murmur heard. High-pitched blowing early systolic murmur is present with a grade of 1/6  at the apex  Pulses:       Carotid pulses are 3+ on the right side, and 3+ on the left side. Pulmonary/Chest: Effort normal. She has no rales. Abdominal: Soft. There is no tenderness. Musculoskeletal: She exhibits no edema. Neurological: She is alert and oriented to person, place, and time. No cranial nerve deficit. Skin: Skin is warm and dry. Psychiatric: She has a normal mood and affect.  Her behavior is normal.     Visit Vitals    /68    Pulse 63    Ht 5' 5\" (1.651 m)    Wt 57.2 kg (126 lb)    SpO2 90%    BMI 20.97 kg/m2       Past Medical History:   Diagnosis Date    Bursitis of both shoulders     Cardiac echocardiogram, abnormal 07/01/2016    Definity enhanced. EF 20% (prev 35% on study of 9/5/12). Severe diffuse hypk. No mass. RVSP 41 mmHg. Severe LAE. Mod MR. Mod TR.  Cardiac thallium stress test 01/07/2002    Mild anteroseptal scar w/o ongoing ischemia. Appearance of dilated cardiomyopathy. LVE w/stress & rest.      Contact dermatitis and other eczema, due to unspecified cause     Dilated cardiomyopathy ([address]     Goiter     or thyroid mass    Heart attack ([address]     Hypercholesterolemia     Hypertension     Mitral regurgitation     Mitral valve disorders(424.0)     Osteoarthritis of both knees     Pacemaker     S/P ICD (internal cardiac defibrillator) procedure     SOB (shortness of breath)     Thyroid disorder     Wears glasses        Social History     Social History    Marital status:      Spouse name: N/A    Number of children: N/A    Years of education: N/A     Occupational History    Not on file. Social History Main Topics    Smoking status: Former Smoker     Packs/day: 1.00     Quit date: 6/7/1998    Smokeless tobacco: Never Used    Alcohol use No    Drug use: No    Sexual activity: Not on file     Other Topics Concern    Not on file     Social History Narrative       Family History   Problem Relation Age of Onset    Diabetes Other     Heart Disease Other     Hypertension Other     Arthritis-osteo Other        Past Surgical History:   Procedure Laterality Date    HX PACEMAKER  6/2006    ICD implantation    HX PACEMAKER  11/09    Upgrade of ICD to biventricular ICD, with left lead implantation and generator change.     INITIAL ICD GENERATOR/LEAD EVAL         Current Outpatient Prescriptions   Medication Sig Dispense Refill    spironolactone (ALDACTONE) 25 mg tablet TAKE ONE TABLET BY MOUTH ONCE DAILY 90 Tab 3    LORazepam (ATIVAN) 0.5 mg tablet Take 1 Tab by mouth every four (4) hours as needed for Anxiety. Max Daily Amount: 3 mg. 15 Tab 0    atorvastatin (LIPITOR) 20 mg tablet Take  by mouth daily.  digoxin (LANOXIN) 0.125 mg tablet Take 1 Tab by mouth daily. 30 Tab 11    LORATADINE/PSEUDOEPHEDRINE SUL (CLARITIN-D 24 HOUR PO) Take  by mouth as needed.  METOPROLOL SUCCINATE (TOPROL XL PO) Take 100 mg by mouth daily.  FUROSEMIDE (LASIX PO) Take 40 mg by mouth daily.  LEVOTHYROXINE SODIUM (SYNTHROID PO) Take 100 mcg by mouth daily. EKG: unchanged from previous tracings, 1:1 Biv pacing    ASSESSMENT and PLAN  Encounter Diagnoses   Name Primary?  Dilated cardiomyopathy, EF 35%. Yes    Mitral valve disorder,secondary MR     ICD implantation in June 2006/upgrade with left ventricular lead placement and generator change in November 2009. She looks good and complains of no cardiac symptoms. Interrogation of her device revealed normal OptiVol indicating normal volume status and no major ventricular arrhythmia. She appears to be very comfortable. At this time, continue current medical regimen.

## 2018-05-23 NOTE — PROGRESS NOTES
I have personally seen and evaluated the device findings. Interrogation reviewed and I agree with assessment.     Alin Sanabria

## 2018-05-23 NOTE — MR AVS SNAPSHOT
[address] [id number] [address] [address] [phone] Patient: Shannon Castillo MRN: [id number] Visit Information Date & Time Provider Department Dept. Phone Encounter #  
 5/23/2018 11:00 AM Nacho Reyna MD [facility] [address] [id number] Upcoming Health Maintenance Date Due DTaP/Tdap/Td series (1 - Tdap) 12/13/1953 ZOSTER VACCINE AGE 60> 10/13/1992 GLAUCOMA SCREENING Q2Y 12/13/1997 Bone Densitometry (Dexa) Screening 12/13/1997 Pneumococcal 65+ Low/Medium Risk (1 of 2 - PCV13) 12/13/1997 MEDICARE YEARLY EXAM 3/14/2018 Influenza Age 5 to Adult 8/1/2018 Allergies as of 5/23/2018  Review Complete On: 5/23/2018 By: Nacho Reyna MD  
  
 Severity Noted Reaction Type Reactions Phenobarbital    Unknown (comments) Current Immunizations  Never Reviewed No immunizations on file. Not reviewed this visit You Were Diagnosed With   
  
 Codes Comments Dilated cardiomyopathy ([facility]    -  Primary ICD-10-CM: I42.0 ICD-9-CM: 425.4 Mitral valve disorder     ICD-10-CM: I05.9 ICD-9-CM: 394.9 S/P ICD (internal cardiac defibrillator) procedure     ICD-10-CM: Z95.810 ICD-9-CM: V45.02 Vitals BP Pulse Height(growth percentile) Weight(growth percentile) SpO2 BMI  
 118/68 63 5' 5\" (1.651 m) 126 lb (57.2 kg) 90% 20.97 kg/m2 Smoking Status Former Smoker Vitals History BMI and BSA Data Body Mass Index Body Surface Area  
 20.97 kg/m 2 1.62 m 2 Preferred Pharmacy Pharmacy Name Phone [address] [place] [address]. [phone] Your Updated Medication List  
  
   
This list is accurate as of 5/23/18 12:07 PM.  Always use your most recent med list.  
  
  
  
  
 atorvastatin 20 mg tablet Commonly known as:  LIPITOR Take  by mouth daily. CLARITIN-D 24 HOUR PO Take  by mouth as needed. digoxin 0.125 mg tablet Commonly known as:  LANOXIN Take 1 Tab by mouth daily. LASIX PO Take 40 mg by mouth daily. LORazepam 0.5 mg tablet Commonly known as:  ATIVAN Take 1 Tab by mouth every four (4) hours as needed for Anxiety. Max Daily Amount: 3 mg.  
  
 spironolactone 25 mg tablet Commonly known as:  ALDACTONE  
TAKE ONE TABLET BY MOUTH ONCE DAILY  
  
 SYNTHROID PO Take 100 mcg by mouth daily. TOPROL XL PO Take 100 mg by mouth daily. We Performed the Following AMB POC EKG ROUTINE W/ 12 LEADS, INTER & REP [45317 CPT(R)] Introducing [facility]! [facility] introduces [employer] patient portal. Now you can access parts of your medical record, email your doctor's office, and request medication refills online. 1. In your internet browser, go to [email or web address] 2. Click on the First Time User? Click Here link in the Sign In box. You will see the New Member Sign Up page. 3. Enter your Cubikal Access Code exactly as it appears below. You will not need to use this code after youve completed the sign-up process. If you do not sign up before the expiration date, you must request a new code. · [employer]ik[employer] Access Code: [email or web address] Expires: 7/31/2018  1:59 PM 
 
4. Enter the last four digits of your Social Security Number (xxxx) and Date of Birth (mm/dd/yyyy) as indicated and click Submit. You will be taken to the next sign-up page. 5. Create a Cubikal ID. This will be your Cubikal login ID and cannot be changed, so think of one that is secure and easy to remember. 6. Create a [employer] password. You can change your password at any time. 7. Enter your Password Reset Question and Answer. This can be used at a later time if you forget your password. 8. Enter your e-mail address. You will receive e-mail notification when new information is available in [address] 9. Click Sign Up. You can now view and download portions of your medical record. 10. Click the Download Summary menu link to download a portable copy of your medical information. If you have questions, please visit the Frequently Asked Questions section of the [employer] website. Remember, [employer]Tab is NOT to be used for urgent needs. For medical emergencies, dial 911. Now available from your iPhone and Android! Please provide this summary of care documentation to your next provider. Your primary care clinician is listed as Lima Oakley. If you have any questions after today's visit, please call [phone].

## 2018-05-23 NOTE — PROGRESS NOTES
1. Have you been to the ER, urgent care clinic since your last visit? Hospitalized since your last visit? Yes 05/02/2018 for weakness    2. Have you seen or consulted any other health care providers outside of the [facility] since your last visit? Include any pap smears or colon screening.  No

## 2018-12-03 ENCOUNTER — OFFICE VISIT (OUTPATIENT)
Dept: CARDIOLOGY CLINIC | Age: 83
End: 2018-12-03

## 2018-12-03 ENCOUNTER — CLINICAL SUPPORT (OUTPATIENT)
Dept: CARDIOLOGY CLINIC | Age: 83
End: 2018-12-03

## 2018-12-03 VITALS
WEIGHT: 128 LBS | SYSTOLIC BLOOD PRESSURE: 108 MMHG | OXYGEN SATURATION: 98 % | BODY MASS INDEX: 21.33 KG/M2 | HEIGHT: 65 IN | DIASTOLIC BLOOD PRESSURE: 68 MMHG | HEART RATE: 68 BPM

## 2018-12-03 DIAGNOSIS — I42.0 DILATED CARDIOMYOPATHY (HCC): Primary | ICD-10-CM

## 2018-12-03 DIAGNOSIS — Z95.810 S/P ICD (INTERNAL CARDIAC DEFIBRILLATOR) PROCEDURE: ICD-10-CM

## 2018-12-03 DIAGNOSIS — I05.9 MITRAL VALVE DISORDER: ICD-10-CM

## 2018-12-03 RX ORDER — GUAIFENESIN 100 MG/5ML
81 LIQUID (ML) ORAL DAILY
COMMUNITY
End: 2018-12-03 | Stop reason: ALTCHOICE

## 2018-12-03 RX ORDER — ATORVASTATIN CALCIUM 20 MG/1
TABLET, FILM COATED ORAL DAILY
COMMUNITY
End: 2020-01-01

## 2018-12-03 RX ORDER — ATORVASTATIN CALCIUM 40 MG/1
TABLET, FILM COATED ORAL DAILY
COMMUNITY
End: 2018-12-03 | Stop reason: ALTCHOICE

## 2018-12-03 RX ORDER — AMLODIPINE BESYLATE 10 MG/1
TABLET ORAL DAILY
COMMUNITY
End: 2018-12-03 | Stop reason: ALTCHOICE

## 2018-12-03 NOTE — PROGRESS NOTES
Catie Bahena presents today for   Chief Complaint   Patient presents with    Follow-up     6 month        Catie Bahena preferred language for health care discussion is english/other. Is someone accompanying this pt? Yes, daughter     Is the patient using any DME equipment during OV? Wheel chair     Depression Screening:  PHQ over the last two weeks 10/15/2015   PHQ Not Done Patient Decline   Little interest or pleasure in doing things Not at all   Feeling down, depressed, irritable, or hopeless Not at all   Total Score PHQ 2 0       Learning Assessment:  Learning Assessment 1/21/2014   PRIMARY LEARNER Patient   HIGHEST LEVEL OF EDUCATION - PRIMARY LEARNER  GRADUATED HIGH SCHOOL OR GED   PRIMARY LANGUAGE ENGLISH   LEARNER PREFERENCE PRIMARY READING   ANSWERED BY patient   RELATIONSHIP SELF       Abuse Screening:  No flowsheet data found. Fall Risk  Fall Risk Assessment, last 12 mths 10/15/2015   Able to walk? No       Pt currently taking Antiplatelet therapy? No    Coordination of Care:  1. Have you been to the ER, urgent care clinic since your last visit? Hospitalized since your last visit? NO    2. Have you seen or consulted any other health care providers outside of the [address] [place] [address] since your last visit? Include any pap smears or colon screening.  No

## 2018-12-03 NOTE — PROGRESS NOTES
HISTORY OF PRESENT ILLNESS  Varsha Walters is a 80 y.o. female. HPI  She has been doing well. She offers no cardiac complaints. She indicates that she does go out and walk in the mall at times. She spends most of her time at home in a chair. She denies chest pain, dyspnea, orthopnea, PND. She denies palpitation, dizziness or syncope. She denies any symptoms of TIA or amaurosis fugax. Interrogation of her device shows normal OptiVol indicating normal volume status. She does have approximately 14 months left on the battery. She has a history of a dilated cardiomyopathy with EF in the range of 25% by echocardiogram. She also has moderate to severe mitral regurgitation and pulmonary hypertension documented by echocardiogram in October 2001. The possibility of ischemic heart disease was entertained because of increased risk factors for coronary artery disease, but her thallium myocardial scanning in January 2002 revealed no evidence of ischemia, and it was felt that her cardiomyopathy was a nonischemic-type cardiomyopathy. She has been on a beta blocker, ACE inhibitor, and digoxin and has done well, with no symptoms. A repeat echocardiogram in March 2006 revealed diminished LV function with EF less than 25%. The mitral regurgitation remains to be mildly to moderately severe. She underwent ICD implantation on June 8, 2006, at which time it was intended to place biventricular leads for resynchronization therapy, particularly in light of her wide QRS complex. Unfortunately, after completion of the procedure, the left ventricular lead was noted to have dislodged; therefore, it was removed. She did receive the device capable of cardiac resynchronization therapy for the future. She ultimately had implantation of the left ventricular lead on November 11, 2009, along with the generator change. She was admitted to [facility] on 07/04/16 with urosepsis and metabolic encephalopathy.  She had a repeat echocardiogram on 07/01/16, which demonstrated severe LV dysfunction with EF in the 20% range. PA pressure was estimated at 41 mmHg. The left atrium was severely dilated and the mitral valve showed moderate regurgitation. Review of Systems   Constitutional: Negative for malaise/fatigue and weight loss. HENT: Negative for hearing loss. Eyes: Negative for blurred vision and double vision. Respiratory: Positive for shortness of breath. Cardiovascular: Negative for chest pain, palpitations, orthopnea, claudication, leg swelling and PND. Gastrointestinal: Negative for blood in stool, heartburn and melena. Genitourinary: Negative for dysuria, frequency, hematuria and urgency. Musculoskeletal: Positive for joint pain. Negative for back pain. Skin: Negative for itching and rash. Neurological: Negative for dizziness, loss of consciousness and weakness. Psychiatric/Behavioral: Negative for depression and memory loss. Physical Exam   Constitutional: She is oriented to person, place, and time. She appears well-developed and well-nourished. HENT:   Head: Normocephalic and atraumatic. Eyes: Conjunctivae are normal. Pupils are equal, round, and reactive to light. Neck: Normal range of motion. Neck supple. No JVD present. Cardiovascular: Normal rate, regular rhythm, S1 normal and S2 normal.  No extrasystoles are present. PMI is not displaced. Exam reveals no gallop and no friction rub. Murmur heard. High-pitched blowing early systolic murmur is present with a grade of 1/6 at the apex. Pulses:       Carotid pulses are 3+ on the right side, and 3+ on the left side. Pulmonary/Chest: Effort normal. She has no rales. Abdominal: Soft. There is no tenderness. Musculoskeletal: She exhibits no edema. Neurological: She is alert and oriented to person, place, and time. No cranial nerve deficit. Skin: Skin is warm and dry. Psychiatric: She has a normal mood and affect.  Her behavior is normal.     Visit Vitals  /68   Pulse 68   Ht 5' 5\" (1.651 m)   Wt 58.1 kg (128 lb)   SpO2 98%   BMI 21.30 kg/m²       Past Medical History:   Diagnosis Date    Bursitis of both shoulders     Cardiac echocardiogram, abnormal 2016    Definity enhanced. EF 20% (prev 35% on study of 12). Severe diffuse hypk. No mass. RVSP 41 mmHg. Severe LAE. Mod MR. Mod TR.  Cardiac thallium stress test 2002    Mild anteroseptal scar w/o ongoing ischemia. Appearance of dilated cardiomyopathy.   LVE w/stress & rest.      Contact dermatitis and other eczema, due to unspecified cause     Dilated cardiomyopathy ([address]     Goiter     or thyroid mass    Heart attack ([address]     Hypercholesterolemia     Hypertension     Mitral regurgitation     Mitral valve disorders(424.0)     Osteoarthritis of both knees     Pacemaker     S/P ICD (internal cardiac defibrillator) procedure     SOB (shortness of breath)     Thyroid disorder     Wears glasses        Social History     Socioeconomic History    Marital status:      Spouse name: Not on file    Number of children: Not on file    Years of education: Not on file    Highest education level: Not on file   Social Needs    Financial resource strain: Not on file    Food insecurity - worry: Not on file    Food insecurity - inability: Not on file   [employer] needs - medical: Not on file   [employer] needs - non-medical: Not on file   Occupational History    Not on file   Tobacco Use    Smoking status: Former Smoker     Packs/day: 1.00     Last attempt to quit: 1998     Years since quittin.5    Smokeless tobacco: Never Used   Substance and Sexual Activity    Alcohol use: No    Drug use: No    Sexual activity: Not on file   Other Topics Concern    Not on file   Social History Narrative    Not on file       Family History   Problem Relation Age of Onset    Diabetes Other     Heart Disease Other     Hypertension Other     Arthritis-osteo Other        Past Surgical History:   Procedure Laterality Date    HX PACEMAKER  6/2006    ICD implantation    HX PACEMAKER  11/09    Upgrade of ICD to biventricular ICD, with left lead implantation and generator change.  INITIAL ICD GENERATOR/LEAD EVAL         Current Outpatient Medications   Medication Sig Dispense Refill    atorvastatin (LIPITOR) 20 mg tablet Take  by mouth daily.  spironolactone (ALDACTONE) 25 mg tablet TAKE ONE TABLET BY MOUTH ONCE DAILY 90 Tab 3    LORazepam (ATIVAN) 0.5 mg tablet Take 1 Tab by mouth every four (4) hours as needed for Anxiety. Max Daily Amount: 3 mg. 15 Tab 0    digoxin (LANOXIN) 0.125 mg tablet Take 1 Tab by mouth daily. 30 Tab 11    LORATADINE/PSEUDOEPHEDRINE SUL (CLARITIN-D 24 HOUR PO) Take  by mouth as needed.  METOPROLOL SUCCINATE (TOPROL XL PO) Take 100 mg by mouth daily.  FUROSEMIDE (LASIX PO) Take 40 mg by mouth daily.  LEVOTHYROXINE SODIUM (SYNTHROID PO) Take 100 mcg by mouth daily. EKG: unchanged from previous tracings, 1:1 Biv.pacing  . ASSESSMENT and PLAN  Encounter Diagnoses   Name Primary?  Dilated cardiomyopathy, EF 35%. Yes    Mitral valve disorder     ICD implantation in June 2006/upgrade with left ventricular lead placement and generator change in November 2009. She seems to be stable. She has had no symptoms to indicate angina or cardiac decompensation. Her OptiVol is normal indicating normal volume status. For now, she will be continued on current medical regimen.

## 2018-12-04 NOTE — PROGRESS NOTES
I have personally seen and evaluated the device findings. Interrogation reviewed and I agree with assessment.     Sallie Llamas

## 2019-01-01 ENCOUNTER — CLINICAL SUPPORT (OUTPATIENT)
Dept: CARDIOLOGY CLINIC | Age: 84
End: 2019-01-01

## 2019-01-01 ENCOUNTER — HOSPITAL ENCOUNTER (OUTPATIENT)
Dept: LAB | Age: 84
Discharge: HOME | End: 2019-07-01
Payer: MEDICARE

## 2019-01-01 ENCOUNTER — OFFICE VISIT (OUTPATIENT)
Dept: CARDIOLOGY CLINIC | Age: 84
End: 2019-01-01

## 2019-01-01 VITALS
OXYGEN SATURATION: 98 % | DIASTOLIC BLOOD PRESSURE: 66 MMHG | HEIGHT: 65 IN | SYSTOLIC BLOOD PRESSURE: 106 MMHG | HEART RATE: 66 BPM | BODY MASS INDEX: 21.3 KG/M2

## 2019-01-01 DIAGNOSIS — I42.0 DILATED CARDIOMYOPATHY (HCC): Primary | ICD-10-CM

## 2019-01-01 DIAGNOSIS — Z95.810 S/P ICD (INTERNAL CARDIAC DEFIBRILLATOR) PROCEDURE: ICD-10-CM

## 2019-01-01 DIAGNOSIS — Z95.810 S/P ICD (INTERNAL CARDIAC DEFIBRILLATOR) PROCEDURE: Primary | ICD-10-CM

## 2019-01-01 DIAGNOSIS — Z45.02 AICD AT END OF BATTERY LIFE: ICD-10-CM

## 2019-01-01 DIAGNOSIS — I05.9 MITRAL VALVE DISORDER: ICD-10-CM

## 2019-01-01 LAB
ALBUMIN SERPL-MCNC: 3.6 G/DL (ref 3.4–5)
ALBUMIN/GLOB SERPL: 0.9 {RATIO} (ref 0.8–1.7)
ALP SERPL-CCNC: 104 U/L (ref 45–117)
ALT SERPL-CCNC: 22 U/L (ref 13–56)
ANION GAP SERPL CALC-SCNC: 9 MMOL/L (ref 3–18)
AST SERPL-CCNC: 19 U/L (ref 15–37)
BASOPHILS # BLD: 0 K/UL (ref 0–0.1)
BASOPHILS NFR BLD: 1 % (ref 0–2)
BILIRUB SERPL-MCNC: 0.4 MG/DL (ref 0.2–1)
BUN SERPL-MCNC: 16 MG/DL (ref 7–18)
BUN/CREAT SERPL: 14 (ref 12–20)
CALCIUM SERPL-MCNC: 9.1 MG/DL (ref 8.5–10.1)
CHLORIDE SERPL-SCNC: 107 MMOL/L (ref 100–108)
CHOLEST SERPL-MCNC: 180 MG/DL
CO2 SERPL-SCNC: 26 MMOL/L (ref 21–32)
CREAT SERPL-MCNC: 1.12 MG/DL (ref 0.6–1.3)
DIFFERENTIAL METHOD BLD: ABNORMAL
EOSINOPHIL # BLD: 0.1 K/UL (ref 0–0.4)
EOSINOPHIL NFR BLD: 2 % (ref 0–5)
ERYTHROCYTE [DISTWIDTH] IN BLOOD BY AUTOMATED COUNT: 16 % (ref 11.6–14.5)
GLOBULIN SER CALC-MCNC: 4.2 G/DL (ref 2–4)
GLUCOSE SERPL-MCNC: 95 MG/DL (ref 74–99)
HCT VFR BLD AUTO: 41.5 % (ref 35–45)
HDLC SERPL-MCNC: 40 MG/DL (ref 40–60)
HDLC SERPL: 4.5 {RATIO} (ref 0–5)
HGB BLD-MCNC: 13.3 G/DL (ref 12–16)
LDLC SERPL CALC-MCNC: 104 MG/DL (ref 0–100)
LIPID PROFILE,FLP: ABNORMAL
LYMPHOCYTES # BLD: 2.5 K/UL (ref 0.9–3.6)
LYMPHOCYTES NFR BLD: 43 % (ref 21–52)
MCH RBC QN AUTO: 29.1 PG (ref 24–34)
MCHC RBC AUTO-ENTMCNC: 32 G/DL (ref 31–37)
MCV RBC AUTO: 90.8 FL (ref 74–97)
MONOCYTES # BLD: 0.5 K/UL (ref 0.05–1.2)
MONOCYTES NFR BLD: 9 % (ref 3–10)
NEUTS SEG # BLD: 2.6 K/UL (ref 1.8–8)
NEUTS SEG NFR BLD: 45 % (ref 40–73)
PLATELET # BLD AUTO: 187 K/UL (ref 135–420)
PMV BLD AUTO: 12.6 FL (ref 9.2–11.8)
POTASSIUM SERPL-SCNC: 4.4 MMOL/L (ref 3.5–5.5)
PROT SERPL-MCNC: 7.8 G/DL (ref 6.4–8.2)
RBC # BLD AUTO: 4.57 M/UL (ref 4.2–5.3)
SODIUM SERPL-SCNC: 142 MMOL/L (ref 136–145)
TRIGL SERPL-MCNC: 180 MG/DL (ref ?–150)
TSH SERPL DL<=0.05 MIU/L-ACNC: 3.96 UIU/ML (ref 0.36–3.74)
VLDLC SERPL CALC-MCNC: 36 MG/DL
WBC # BLD AUTO: 5.7 K/UL (ref 4.6–13.2)

## 2019-01-01 PROCEDURE — 85025 COMPLETE CBC W/AUTO DIFF WBC: CPT

## 2019-01-01 PROCEDURE — 80053 COMPREHEN METABOLIC PANEL: CPT

## 2019-01-01 PROCEDURE — 80061 LIPID PANEL: CPT

## 2019-01-01 PROCEDURE — 36415 COLL VENOUS BLD VENIPUNCTURE: CPT

## 2019-01-01 PROCEDURE — 84443 ASSAY THYROID STIM HORMONE: CPT

## 2019-03-25 RX ORDER — SPIRONOLACTONE 25 MG/1
TABLET ORAL
Qty: 90 TAB | Refills: 3 | Status: SHIPPED | OUTPATIENT
Start: 2019-03-25

## 2019-06-03 NOTE — PROGRESS NOTES
HISTORY OF PRESENT ILLNESS  Alexandra Tang is a 80 y.o. female. HPI  She has been feeling quite well. She looks good for her age. However, she is now almost wheelchair bound. She does not have enough strength in her legs. She has had no chest pain, dyspnea, orthopnea, PND, palpitation, dizziness or syncope. She has had no symptoms to indicate TIA or amaurosis fugax. Interrogation of her ICD demonstrated normal OptiVol. There was no significant ventricular or atrial arrhythmia. There is approximately less than one year left on the battery. She has a history of a dilated cardiomyopathy with EF in the range of 25% by echocardiogram. She also has moderate to severe mitral regurgitation and pulmonary hypertension documented by echocardiogram in October 2001. The possibility of ischemic heart disease was entertained because of increased risk factors for coronary artery disease, but her thallium myocardial scanning in January 2002 revealed no evidence of ischemia, and it was felt that her cardiomyopathy was a nonischemic-type cardiomyopathy. She has been on a beta blocker, ACE inhibitor, and digoxin and has done well, with no symptoms. A repeat echocardiogram in March 2006 revealed diminished LV function with EF less than 25%. The mitral regurgitation remains to be mildly to moderately severe. She underwent ICD implantation on June 8, 2006, at which time it was intended to place biventricular leads for resynchronization therapy, particularly in light of her wide QRS complex. Unfortunately, after completion of the procedure, the left ventricular lead was noted to have dislodged; therefore, it was removed. She did receive the device capable of cardiac resynchronization therapy for the future. She ultimately had implantation of the left ventricular lead on November 11, 2009, along with the generator change.   She was admitted to [facility] on 07/04/16 with urosepsis and metabolic encephalopathy. She had a repeat echocardiogram on 07/01/16, which demonstrated severe LV dysfunction with EF in the 20% range. PA pressure was estimated at 41 mmHg. The left atrium was severely dilated and the mitral valve showed moderate regurgitation. Review of Systems   Constitutional: Negative for malaise/fatigue and weight loss. HENT: Positive for hearing loss. Eyes: Negative for blurred vision and double vision. Respiratory: Negative for shortness of breath. Cardiovascular: Negative for chest pain, palpitations, orthopnea, claudication, leg swelling and PND. Gastrointestinal: Negative for blood in stool, heartburn and melena. Genitourinary: Negative for dysuria, frequency, hematuria and urgency. Musculoskeletal: Positive for joint pain. Negative for back pain. Skin: Negative for itching and rash. Neurological: Negative for dizziness and loss of consciousness. Psychiatric/Behavioral: Negative for depression and memory loss. Physical Exam   Constitutional: She is oriented to person, place, and time. She appears well-developed and well-nourished. HENT:   Head: Normocephalic and atraumatic. Eyes: Pupils are equal, round, and reactive to light. Conjunctivae are normal.   Neck: Normal range of motion. Neck supple. No JVD present. Cardiovascular: Normal rate, regular rhythm, S1 normal and S2 normal.  No extrasystoles are present. PMI is not displaced. Exam reveals no gallop and no friction rub. Murmur heard. High-pitched blowing early systolic murmur is present with a grade of 1/6 at the apex. Pulses:       Carotid pulses are 3+ on the right side, and 3+ on the left side. Pulmonary/Chest: Effort normal. She has no rales. Abdominal: Soft. There is no tenderness. Musculoskeletal: She exhibits no edema. Neurological: She is alert and oriented to person, place, and time. No cranial nerve deficit. Skin: Skin is warm and dry.    Psychiatric: She has a normal mood and affect. Her behavior is normal.     Visit Vitals  /66   Pulse 66   Ht 5' 5\" (1.651 m)   SpO2 98%   BMI 21.30 kg/m²       Past Medical History:   Diagnosis Date    Bursitis of both shoulders     Cardiac echocardiogram, abnormal 2016    Definity enhanced. EF 20% (prev 35% on study of 12). Severe diffuse hypk. No mass. RVSP 41 mmHg. Severe LAE. Mod MR. Mod TR.  Cardiac thallium stress test 2002    Mild anteroseptal scar w/o ongoing ischemia. Appearance of dilated cardiomyopathy.   LVE w/stress & rest.      Contact dermatitis and other eczema, due to unspecified cause     Dilated cardiomyopathy ([address]     Goiter     or thyroid mass    Heart attack ([address]     Hypercholesterolemia     Hypertension     Mitral regurgitation     Mitral valve disorders(424.0)     Osteoarthritis of both knees     Pacemaker     S/P ICD (internal cardiac defibrillator) procedure     SOB (shortness of breath)     Thyroid disorder     Wears glasses        Social History     Socioeconomic History    Marital status:      Spouse name: Not on file    Number of children: Not on file    Years of education: Not on file    Highest education level: Not on file   Occupational History    Not on file   Social Needs    Financial resource strain: Not on file    Food insecurity:     Worry: Not on file     Inability: Not on file    Transportation needs:     Medical: Not on file     Non-medical: Not on file   Tobacco Use    Smoking status: Former Smoker     Packs/day: 1.00     Last attempt to quit: 1998     Years since quittin.0    Smokeless tobacco: Never Used   Substance and Sexual Activity    Alcohol use: No    Drug use: No    Sexual activity: Not on file   Lifestyle    Physical activity:     Days per week: Not on file     Minutes per session: Not on file    Stress: Not on file   Relationships    Social connections:     Talks on phone: Not on file     Gets together: Not on file Attends Spiritism service: Not on file     Active member of club or organization: Not on file     Attends meetings of clubs or organizations: Not on file     Relationship status: Not on file    Intimate partner violence:     Fear of current or ex partner: Not on file     Emotionally abused: Not on file     Physically abused: Not on file     Forced sexual activity: Not on file   Other Topics Concern    Not on file   Social History Narrative    Not on file       Family History   Problem Relation Age of Onset    Diabetes Other     Heart Disease Other     Hypertension Other     Arthritis-osteo Other        Past Surgical History:   Procedure Laterality Date    HX PACEMAKER  6/2006    ICD implantation    HX PACEMAKER  11/09    Upgrade of ICD to biventricular ICD, with left lead implantation and generator change.  INITIAL ICD GENERATOR/LEAD EVAL         Current Outpatient Medications   Medication Sig Dispense Refill    spironolactone (ALDACTONE) 25 mg tablet TAKE 1 TABLET BY MOUTH ONCE DAILY 90 Tab 3    atorvastatin (LIPITOR) 20 mg tablet Take  by mouth daily.  LORazepam (ATIVAN) 0.5 mg tablet Take 1 Tab by mouth every four (4) hours as needed for Anxiety. Max Daily Amount: 3 mg. (Patient taking differently: Take 1 mg by mouth every four (4) hours as needed for Anxiety.) 15 Tab 0    digoxin (LANOXIN) 0.125 mg tablet Take 1 Tab by mouth daily. 30 Tab 11    LORATADINE/PSEUDOEPHEDRINE SUL (CLARITIN-D 24 HOUR PO) Take  by mouth as needed.  METOPROLOL SUCCINATE (TOPROL XL PO) Take 100 mg by mouth daily.  FUROSEMIDE (LASIX PO) Take 40 mg by mouth daily.  LEVOTHYROXINE SODIUM (SYNTHROID PO) Take 100 mcg by mouth daily. EKG: unchanged from previous tracings, 1:1 Biv.pacing  . ASSESSMENT and PLAN  Encounter Diagnoses   Name Primary?  Dilated cardiomyopathy, EF 35%.  Yes    Mitral valve disorder,MR     ICD implantation in June 2006/upgrade with left ventricular lead placement and generator change in November 2009. She has been doing well. She has had no symptoms or signs of cardiac decompensation. She has had no significant atrial or ventricular arrhythmia. She is almost wheelchair bound but looks quite good for her age of 80. For now, she will be continued on current medical regimen. She will need ICD generator change in less than a year.

## 2019-06-10 NOTE — PROGRESS NOTES
I have personally seen and evaluated the device findings. Interrogation reviewed and I agree with assessment.     Moe Hyman

## 2019-09-26 NOTE — PROGRESS NOTES
I have personally seen and evaluated the device findings. Interrogation reviewed and I agree with assessment.     Cecilia Jones

## 2020-01-01 ENCOUNTER — CLINICAL SUPPORT (OUTPATIENT)
Dept: CARDIOLOGY CLINIC | Age: 85
End: 2020-01-01

## 2020-01-01 ENCOUNTER — APPOINTMENT (OUTPATIENT)
Dept: NON INVASIVE DIAGNOSTICS | Age: 85
DRG: 064 | End: 2020-01-01
Attending: PHYSICIAN ASSISTANT
Payer: MEDICARE

## 2020-01-01 ENCOUNTER — HOME CARE VISIT (OUTPATIENT)
Dept: SCHEDULING | Facility: HOME HEALTH | Age: 85
End: 2020-01-01
Payer: MEDICARE

## 2020-01-01 ENCOUNTER — APPOINTMENT (OUTPATIENT)
Dept: GENERAL RADIOLOGY | Age: 85
DRG: 064 | End: 2020-01-01
Attending: EMERGENCY MEDICINE
Payer: MEDICARE

## 2020-01-01 ENCOUNTER — HOME CARE VISIT (OUTPATIENT)
Dept: HOSPICE | Facility: HOSPICE | Age: 85
End: 2020-01-01
Payer: MEDICARE

## 2020-01-01 ENCOUNTER — OFFICE VISIT (OUTPATIENT)
Dept: CARDIOLOGY CLINIC | Age: 85
End: 2020-01-01

## 2020-01-01 ENCOUNTER — HOSPICE ADMISSION (OUTPATIENT)
Dept: HOSPICE | Facility: HOSPICE | Age: 85
End: 2020-01-01
Payer: MEDICARE

## 2020-01-01 ENCOUNTER — HOSPITAL ENCOUNTER (INPATIENT)
Age: 85
LOS: 3 days | Discharge: HOSPICE | DRG: 064 | End: 2020-03-17
Attending: EMERGENCY MEDICINE | Admitting: HOSPITALIST
Payer: MEDICARE

## 2020-01-01 ENCOUNTER — APPOINTMENT (OUTPATIENT)
Dept: VASCULAR SURGERY | Age: 85
DRG: 064 | End: 2020-01-01
Attending: PHYSICIAN ASSISTANT
Payer: MEDICARE

## 2020-01-01 ENCOUNTER — APPOINTMENT (OUTPATIENT)
Dept: CT IMAGING | Age: 85
DRG: 064 | End: 2020-01-01
Attending: EMERGENCY MEDICINE
Payer: MEDICARE

## 2020-01-01 VITALS
HEART RATE: 64 BPM | SYSTOLIC BLOOD PRESSURE: 101 MMHG | DIASTOLIC BLOOD PRESSURE: 65 MMHG | RESPIRATION RATE: 18 BRPM | TEMPERATURE: 96.8 F

## 2020-01-01 VITALS
SYSTOLIC BLOOD PRESSURE: 121 MMHG | DIASTOLIC BLOOD PRESSURE: 74 MMHG | RESPIRATION RATE: 16 BRPM | OXYGEN SATURATION: 96 % | HEART RATE: 67 BPM

## 2020-01-01 VITALS
TEMPERATURE: 96.4 F | RESPIRATION RATE: 16 BRPM | SYSTOLIC BLOOD PRESSURE: 103 MMHG | DIASTOLIC BLOOD PRESSURE: 41 MMHG | OXYGEN SATURATION: 93 % | HEART RATE: 64 BPM

## 2020-01-01 VITALS
BODY MASS INDEX: 21.33 KG/M2 | OXYGEN SATURATION: 93 % | SYSTOLIC BLOOD PRESSURE: 123 MMHG | TEMPERATURE: 97.5 F | DIASTOLIC BLOOD PRESSURE: 57 MMHG | RESPIRATION RATE: 22 BRPM | HEART RATE: 80 BPM | HEIGHT: 65 IN | WEIGHT: 128 LBS

## 2020-01-01 VITALS
OXYGEN SATURATION: 92 % | DIASTOLIC BLOOD PRESSURE: 69 MMHG | RESPIRATION RATE: 16 BRPM | TEMPERATURE: 97 F | HEART RATE: 53 BPM | SYSTOLIC BLOOD PRESSURE: 106 MMHG

## 2020-01-01 VITALS
HEART RATE: 66 BPM | DIASTOLIC BLOOD PRESSURE: 55 MMHG | RESPIRATION RATE: 20 BRPM | TEMPERATURE: 98 F | SYSTOLIC BLOOD PRESSURE: 114 MMHG | OXYGEN SATURATION: 96 %

## 2020-01-01 VITALS
SYSTOLIC BLOOD PRESSURE: 106 MMHG | RESPIRATION RATE: 16 BRPM | OXYGEN SATURATION: 96 % | HEART RATE: 74 BPM | DIASTOLIC BLOOD PRESSURE: 52 MMHG

## 2020-01-01 VITALS — RESPIRATION RATE: 24 BRPM | TEMPERATURE: 97 F | HEART RATE: 65 BPM

## 2020-01-01 VITALS
OXYGEN SATURATION: 95 % | DIASTOLIC BLOOD PRESSURE: 62 MMHG | BODY MASS INDEX: 21.3 KG/M2 | HEART RATE: 70 BPM | HEIGHT: 65 IN | SYSTOLIC BLOOD PRESSURE: 118 MMHG

## 2020-01-01 VITALS — RESPIRATION RATE: 24 BRPM

## 2020-01-01 VITALS
SYSTOLIC BLOOD PRESSURE: 134 MMHG | OXYGEN SATURATION: 91 % | DIASTOLIC BLOOD PRESSURE: 68 MMHG | RESPIRATION RATE: 45 BRPM | TEMPERATURE: 97.7 F | HEART RATE: 64 BPM

## 2020-01-01 VITALS — OXYGEN SATURATION: 92 % | HEART RATE: 60 BPM | RESPIRATION RATE: 24 BRPM | TEMPERATURE: 98 F

## 2020-01-01 DIAGNOSIS — I63.20 CEREBROVASCULAR ACCIDENT (CVA) DUE TO OCCLUSION OF PRECEREBRAL ARTERY (HCC): Primary | ICD-10-CM

## 2020-01-01 DIAGNOSIS — I42.0 DILATED CARDIOMYOPATHY (HCC): Primary | ICD-10-CM

## 2020-01-01 DIAGNOSIS — Z95.810 S/P ICD (INTERNAL CARDIAC DEFIBRILLATOR) PROCEDURE: ICD-10-CM

## 2020-01-01 DIAGNOSIS — I05.9 MITRAL VALVE DISORDER: ICD-10-CM

## 2020-01-01 DIAGNOSIS — I21.4 NSTEMI (NON-ST ELEVATED MYOCARDIAL INFARCTION) (HCC): ICD-10-CM

## 2020-01-01 LAB
AMPHET UR QL SCN: NEGATIVE
ANION GAP SERPL CALC-SCNC: 4 MMOL/L (ref 3–18)
ANION GAP SERPL CALC-SCNC: 5 MMOL/L (ref 3–18)
APPEARANCE UR: ABNORMAL
APTT PPP: 108.6 SEC (ref 23–36.4)
APTT PPP: 149.6 SEC (ref 23–36.4)
APTT PPP: 26.9 SEC (ref 23–36.4)
APTT PPP: 66.3 SEC (ref 23–36.4)
APTT PPP: 66.5 SEC (ref 23–36.4)
APTT PPP: 87.5 SEC (ref 23–36.4)
APTT PPP: 93.8 SEC (ref 23–36.4)
ATRIAL RATE: 93 BPM
BACTERIA SPEC CULT: ABNORMAL
BACTERIA SPEC CULT: ABNORMAL
BACTERIA URNS QL MICRO: ABNORMAL /HPF
BARBITURATES UR QL SCN: NEGATIVE
BASOPHILS # BLD: 0 K/UL (ref 0–0.1)
BASOPHILS # BLD: 0 K/UL (ref 0–0.1)
BASOPHILS NFR BLD: 0 % (ref 0–2)
BASOPHILS NFR BLD: 0 % (ref 0–2)
BENZODIAZ UR QL: NEGATIVE
BILIRUB UR QL: NEGATIVE
BUN SERPL-MCNC: 11 MG/DL (ref 7–18)
BUN SERPL-MCNC: 13 MG/DL (ref 7–18)
BUN/CREAT SERPL: 17 (ref 12–20)
BUN/CREAT SERPL: 18 (ref 12–20)
CALCIUM SERPL-MCNC: 8.4 MG/DL (ref 8.5–10.1)
CALCIUM SERPL-MCNC: 9 MG/DL (ref 8.5–10.1)
CALCULATED P AXIS, ECG09: 117 DEGREES
CALCULATED R AXIS, ECG10: -29 DEGREES
CALCULATED T AXIS, ECG11: 64 DEGREES
CANNABINOIDS UR QL SCN: NEGATIVE
CHLORIDE SERPL-SCNC: 109 MMOL/L (ref 100–111)
CHLORIDE SERPL-SCNC: 111 MMOL/L (ref 100–111)
CK MB CFR SERPL CALC: ABNORMAL % (ref 0–4)
CK MB SERPL-MCNC: <1 NG/ML (ref 5–25)
CK SERPL-CCNC: 46 U/L (ref 26–192)
CO2 SERPL-SCNC: 24 MMOL/L (ref 21–32)
CO2 SERPL-SCNC: 28 MMOL/L (ref 21–32)
COCAINE UR QL SCN: NEGATIVE
COLOR UR: YELLOW
CREAT SERPL-MCNC: 0.6 MG/DL (ref 0.6–1.3)
CREAT SERPL-MCNC: 0.77 MG/DL (ref 0.6–1.3)
DIAGNOSIS, 93000: NORMAL
DIFFERENTIAL METHOD BLD: ABNORMAL
DIFFERENTIAL METHOD BLD: ABNORMAL
ECHO AO ROOT DIAM: 2.95 CM
ECHO LA AREA 4C: 26.6 CM2
ECHO LA VOL 2C: 57.37 ML (ref 22–52)
ECHO LA VOL 4C: 83.66 ML (ref 22–52)
ECHO LA VOL BP: 80.81 ML (ref 22–52)
ECHO LA VOL/BSA BIPLANE: 49.38 ML/M2 (ref 16–28)
ECHO LA VOLUME INDEX A2C: 35.06 ML/M2 (ref 16–28)
ECHO LA VOLUME INDEX A4C: 51.13 ML/M2 (ref 16–28)
ECHO LV EDV TEICHHOLZ: 0.93 ML
ECHO LV ESV TEICHHOLZ: 0.87 ML
ECHO LV INTERNAL DIMENSION DIASTOLIC: 5.59 CM (ref 3.9–5.3)
ECHO LV INTERNAL DIMENSION SYSTOLIC: 5.44 CM
ECHO LV IVSD: 0.94 CM (ref 0.6–0.9)
ECHO LV MASS 2D: 229.1 G (ref 67–162)
ECHO LV MASS INDEX 2D: 140 G/M2 (ref 43–95)
ECHO LV POSTERIOR WALL DIASTOLIC: 0.88 CM (ref 0.6–0.9)
ECHO LVOT DIAM: 2.07 CM
ECHO LVOT PEAK GRADIENT: 2.5 MMHG
ECHO LVOT PEAK VELOCITY: 78.44 CM/S
ECHO LVOT VTI: 12.4 CM
ECHO TV REGURGITANT MAX VELOCITY: 282.25 CM/S
ECHO TV REGURGITANT PEAK GRADIENT: 31.9 MMHG
EOSINOPHIL # BLD: 0.2 K/UL (ref 0–0.4)
EOSINOPHIL # BLD: 0.2 K/UL (ref 0–0.4)
EOSINOPHIL NFR BLD: 2 % (ref 0–5)
EOSINOPHIL NFR BLD: 3 % (ref 0–5)
EPITH CASTS URNS QL MICRO: ABNORMAL /LPF (ref 0–5)
ERYTHROCYTE [DISTWIDTH] IN BLOOD BY AUTOMATED COUNT: 16 % (ref 11.6–14.5)
ERYTHROCYTE [DISTWIDTH] IN BLOOD BY AUTOMATED COUNT: 16.2 % (ref 11.6–14.5)
GLUCOSE BLD STRIP.AUTO-MCNC: 104 MG/DL (ref 70–110)
GLUCOSE BLD STRIP.AUTO-MCNC: 104 MG/DL (ref 70–110)
GLUCOSE BLD STRIP.AUTO-MCNC: 106 MG/DL (ref 70–110)
GLUCOSE BLD STRIP.AUTO-MCNC: 108 MG/DL (ref 70–110)
GLUCOSE BLD STRIP.AUTO-MCNC: 128 MG/DL (ref 70–110)
GLUCOSE BLD STRIP.AUTO-MCNC: 134 MG/DL (ref 70–110)
GLUCOSE BLD STRIP.AUTO-MCNC: 99 MG/DL (ref 70–110)
GLUCOSE SERPL-MCNC: 106 MG/DL (ref 74–99)
GLUCOSE SERPL-MCNC: 94 MG/DL (ref 74–99)
GLUCOSE UR STRIP.AUTO-MCNC: NEGATIVE MG/DL
HCT VFR BLD AUTO: 36.7 % (ref 35–45)
HCT VFR BLD AUTO: 38 % (ref 35–45)
HDSCOM,HDSCOM: NORMAL
HGB BLD-MCNC: 11.8 G/DL (ref 12–16)
HGB BLD-MCNC: 12.2 G/DL (ref 12–16)
HGB UR QL STRIP: ABNORMAL
INR PPP: 1 (ref 0.8–1.2)
KETONES UR QL STRIP.AUTO: NEGATIVE MG/DL
LEUKOCYTE ESTERASE UR QL STRIP.AUTO: ABNORMAL
LVFS 2D: 2.72 %
LVOT MG: 1.24 MMHG
LVOT MV: 0.51 CM/S
LVSV (TEICH): 5.79 ML
LYMPHOCYTES # BLD: 2.3 K/UL (ref 0.9–3.6)
LYMPHOCYTES # BLD: 2.8 K/UL (ref 0.9–3.6)
LYMPHOCYTES NFR BLD: 32 % (ref 21–52)
LYMPHOCYTES NFR BLD: 36 % (ref 21–52)
MCH RBC QN AUTO: 28.6 PG (ref 24–34)
MCH RBC QN AUTO: 28.8 PG (ref 24–34)
MCHC RBC AUTO-ENTMCNC: 32.1 G/DL (ref 31–37)
MCHC RBC AUTO-ENTMCNC: 32.2 G/DL (ref 31–37)
MCV RBC AUTO: 89.2 FL (ref 74–97)
MCV RBC AUTO: 89.5 FL (ref 74–97)
METHADONE UR QL: NEGATIVE
MONOCYTES # BLD: 0.5 K/UL (ref 0.05–1.2)
MONOCYTES # BLD: 0.7 K/UL (ref 0.05–1.2)
MONOCYTES NFR BLD: 7 % (ref 3–10)
MONOCYTES NFR BLD: 9 % (ref 3–10)
MUCOUS THREADS URNS QL MICRO: ABNORMAL /LPF
NEUTS SEG # BLD: 4.1 K/UL (ref 1.8–8)
NEUTS SEG # BLD: 4.2 K/UL (ref 1.8–8)
NEUTS SEG NFR BLD: 53 % (ref 40–73)
NEUTS SEG NFR BLD: 58 % (ref 40–73)
NITRITE UR QL STRIP.AUTO: POSITIVE
OPIATES UR QL: NEGATIVE
P-R INTERVAL, ECG05: 158 MS
PCP UR QL: NEGATIVE
PH UR STRIP: 5 [PH] (ref 5–8)
PLATELET # BLD AUTO: 154 K/UL (ref 135–420)
PLATELET # BLD AUTO: 159 K/UL (ref 135–420)
PMV BLD AUTO: 12.4 FL (ref 9.2–11.8)
PMV BLD AUTO: 12.9 FL (ref 9.2–11.8)
POTASSIUM SERPL-SCNC: 3.5 MMOL/L (ref 3.5–5.5)
POTASSIUM SERPL-SCNC: 4.1 MMOL/L (ref 3.5–5.5)
PROT UR STRIP-MCNC: ABNORMAL MG/DL
PROTHROMBIN TIME: 13.3 SEC (ref 11.5–15.2)
Q-T INTERVAL, ECG07: 446 MS
QRS DURATION, ECG06: 194 MS
QTC CALCULATION (BEZET), ECG08: 446 MS
RBC # BLD AUTO: 4.1 M/UL (ref 4.2–5.3)
RBC # BLD AUTO: 4.26 M/UL (ref 4.2–5.3)
RBC #/AREA URNS HPF: ABNORMAL /HPF (ref 0–5)
SERVICE CMNT-IMP: ABNORMAL
SODIUM SERPL-SCNC: 138 MMOL/L (ref 136–145)
SODIUM SERPL-SCNC: 143 MMOL/L (ref 136–145)
SP GR UR REFRACTOMETRY: >1.03 (ref 1–1.03)
T3FREE SERPL-MCNC: 1.7 PG/ML (ref 2.18–3.98)
T4 FREE SERPL-MCNC: 1.1 NG/DL (ref 0.7–1.5)
TROPONIN I SERPL-MCNC: 0.81 NG/ML (ref 0–0.04)
TROPONIN I SERPL-MCNC: 1.11 NG/ML (ref 0–0.04)
TROPONIN I SERPL-MCNC: 1.38 NG/ML (ref 0–0.04)
TSH SERPL DL<=0.05 MIU/L-ACNC: 2.18 UIU/ML (ref 0.36–3.74)
UROBILINOGEN UR QL STRIP.AUTO: 1 EU/DL (ref 0.2–1)
VENTRICULAR RATE, ECG03: 60 BPM
WBC # BLD AUTO: 7.3 K/UL (ref 4.6–13.2)
WBC # BLD AUTO: 7.8 K/UL (ref 4.6–13.2)
WBC URNS QL MICRO: ABNORMAL /HPF (ref 0–4)

## 2020-01-01 PROCEDURE — 97535 SELF CARE MNGMENT TRAINING: CPT

## 2020-01-01 PROCEDURE — 0651 HSPC ROUTINE HOME CARE

## 2020-01-01 PROCEDURE — 65270000029 HC RM PRIVATE

## 2020-01-01 PROCEDURE — 82962 GLUCOSE BLOOD TEST: CPT

## 2020-01-01 PROCEDURE — G0299 HHS/HOSPICE OF RN EA 15 MIN: HCPCS

## 2020-01-01 PROCEDURE — 3336500001 HSPC ELECTION

## 2020-01-01 PROCEDURE — 85730 THROMBOPLASTIN TIME PARTIAL: CPT

## 2020-01-01 PROCEDURE — G0155 HHCP-SVS OF CSW,EA 15 MIN: HCPCS

## 2020-01-01 PROCEDURE — 84481 FREE ASSAY (FT-3): CPT

## 2020-01-01 PROCEDURE — 71045 X-RAY EXAM CHEST 1 VIEW: CPT

## 2020-01-01 PROCEDURE — 99285 EMERGENCY DEPT VISIT HI MDM: CPT

## 2020-01-01 PROCEDURE — 74011250636 HC RX REV CODE- 250/636: Performed by: EMERGENCY MEDICINE

## 2020-01-01 PROCEDURE — 84443 ASSAY THYROID STIM HORMONE: CPT

## 2020-01-01 PROCEDURE — 74011000258 HC RX REV CODE- 258: Performed by: PHYSICIAN ASSISTANT

## 2020-01-01 PROCEDURE — A6250 SKIN SEAL PROTECT MOISTURIZR: HCPCS

## 2020-01-01 PROCEDURE — 77030038269 HC DRN EXT URIN PURWCK BARD -A

## 2020-01-01 PROCEDURE — 92610 EVALUATE SWALLOWING FUNCTION: CPT

## 2020-01-01 PROCEDURE — 81001 URINALYSIS AUTO W/SCOPE: CPT

## 2020-01-01 PROCEDURE — 87186 SC STD MICRODIL/AGAR DIL: CPT

## 2020-01-01 PROCEDURE — 36415 COLL VENOUS BLD VENIPUNCTURE: CPT

## 2020-01-01 PROCEDURE — 74011636320 HC RX REV CODE- 636/320: Performed by: EMERGENCY MEDICINE

## 2020-01-01 PROCEDURE — 74011250636 HC RX REV CODE- 250/636: Performed by: HOSPITALIST

## 2020-01-01 PROCEDURE — 74011250637 HC RX REV CODE- 250/637: Performed by: EMERGENCY MEDICINE

## 2020-01-01 PROCEDURE — 80048 BASIC METABOLIC PNL TOTAL CA: CPT

## 2020-01-01 PROCEDURE — 85025 COMPLETE CBC W/AUTO DIFF WBC: CPT

## 2020-01-01 PROCEDURE — A6216 NON-STERILE GAUZE<=16 SQ IN: HCPCS

## 2020-01-01 PROCEDURE — C8929 TTE W OR WO FOL WCON,DOPPLER: HCPCS

## 2020-01-01 PROCEDURE — 93970 EXTREMITY STUDY: CPT

## 2020-01-01 PROCEDURE — 77030040393 HC DRSG OPTIFOAM GENT MDII -B

## 2020-01-01 PROCEDURE — 97530 THERAPEUTIC ACTIVITIES: CPT

## 2020-01-01 PROCEDURE — HOSPICE MEDICATION HC HH HOSPICE MEDICATION

## 2020-01-01 PROCEDURE — 82550 ASSAY OF CK (CPK): CPT

## 2020-01-01 PROCEDURE — 74011000250 HC RX REV CODE- 250: Performed by: HOSPITALIST

## 2020-01-01 PROCEDURE — 74011250637 HC RX REV CODE- 250/637: Performed by: PHYSICIAN ASSISTANT

## 2020-01-01 PROCEDURE — 76450000000

## 2020-01-01 PROCEDURE — 80307 DRUG TEST PRSMV CHEM ANLYZR: CPT

## 2020-01-01 PROCEDURE — 74011250637 HC RX REV CODE- 250/637: Performed by: HOSPITALIST

## 2020-01-01 PROCEDURE — 87086 URINE CULTURE/COLONY COUNT: CPT

## 2020-01-01 PROCEDURE — A6213 FOAM DRG >16<=48 SQ IN W/BDR: HCPCS

## 2020-01-01 PROCEDURE — 70450 CT HEAD/BRAIN W/O DYE: CPT

## 2020-01-01 PROCEDURE — 70496 CT ANGIOGRAPHY HEAD: CPT

## 2020-01-01 PROCEDURE — 85610 PROTHROMBIN TIME: CPT

## 2020-01-01 PROCEDURE — 3331090004 HSPC SERVICE INTENSITY ADD-ON

## 2020-01-01 PROCEDURE — 97166 OT EVAL MOD COMPLEX 45 MIN: CPT

## 2020-01-01 PROCEDURE — 84439 ASSAY OF FREE THYROXINE: CPT

## 2020-01-01 PROCEDURE — 84484 ASSAY OF TROPONIN QUANT: CPT

## 2020-01-01 PROCEDURE — 51798 US URINE CAPACITY MEASURE: CPT

## 2020-01-01 PROCEDURE — A6260 WOUND CLEANSER ANY TYPE/SIZE: HCPCS

## 2020-01-01 PROCEDURE — 93005 ELECTROCARDIOGRAM TRACING: CPT

## 2020-01-01 PROCEDURE — 97162 PT EVAL MOD COMPLEX 30 MIN: CPT

## 2020-01-01 PROCEDURE — 87077 CULTURE AEROBIC IDENTIFY: CPT

## 2020-01-01 PROCEDURE — 77030040392 HC DRSG OPTIFOAM MDII -A

## 2020-01-01 RX ORDER — ATORVASTATIN CALCIUM 80 MG/1
80 TABLET, FILM COATED ORAL
Qty: 30 TAB | Refills: 0 | Status: SHIPPED | OUTPATIENT
Start: 2020-01-01 | End: 2020-01-01

## 2020-01-01 RX ORDER — ASPIRIN 300 MG/1
300 SUPPOSITORY RECTAL DAILY
Status: DISCONTINUED | OUTPATIENT
Start: 2020-01-01 | End: 2020-01-01

## 2020-01-01 RX ORDER — HEPARIN SODIUM 1000 [USP'U]/ML
60 INJECTION, SOLUTION INTRAVENOUS; SUBCUTANEOUS ONCE
Status: COMPLETED | OUTPATIENT
Start: 2020-01-01 | End: 2020-01-01

## 2020-01-01 RX ORDER — LABETALOL HCL 20 MG/4 ML
10 SYRINGE (ML) INTRAVENOUS
Status: DISCONTINUED | OUTPATIENT
Start: 2020-01-01 | End: 2020-01-01 | Stop reason: HOSPADM

## 2020-01-01 RX ORDER — ACETAMINOPHEN 650 MG/1
325 SUPPOSITORY RECTAL
Status: DISCONTINUED | OUTPATIENT
Start: 2020-01-01 | End: 2020-01-01 | Stop reason: HOSPADM

## 2020-01-01 RX ORDER — DIGOXIN 125 MCG
0.12 TABLET ORAL DAILY
Status: DISCONTINUED | OUTPATIENT
Start: 2020-01-01 | End: 2020-01-01 | Stop reason: HOSPADM

## 2020-01-01 RX ORDER — HEPARIN SODIUM 10000 [USP'U]/100ML
12-25 INJECTION, SOLUTION INTRAVENOUS
Status: DISCONTINUED | OUTPATIENT
Start: 2020-01-01 | End: 2020-01-01

## 2020-01-01 RX ORDER — CIPROFLOXACIN 500 MG/1
500 TABLET ORAL 2 TIMES DAILY
Qty: 10 TAB | Refills: 0 | Status: SHIPPED | OUTPATIENT
Start: 2020-01-01

## 2020-01-01 RX ORDER — LEVOTHYROXINE SODIUM 100 UG/1
100 TABLET ORAL DAILY
Status: DISCONTINUED | OUTPATIENT
Start: 2020-01-01 | End: 2020-01-01 | Stop reason: HOSPADM

## 2020-01-01 RX ORDER — DEXTROSE MONOHYDRATE AND SODIUM CHLORIDE 5; .45 G/100ML; G/100ML
50 INJECTION, SOLUTION INTRAVENOUS CONTINUOUS
Status: DISCONTINUED | OUTPATIENT
Start: 2020-01-01 | End: 2020-01-01 | Stop reason: HOSPADM

## 2020-01-01 RX ORDER — ATORVASTATIN CALCIUM 40 MG/1
80 TABLET, FILM COATED ORAL
Status: DISCONTINUED | OUTPATIENT
Start: 2020-01-01 | End: 2020-01-01 | Stop reason: HOSPADM

## 2020-01-01 RX ORDER — SODIUM CHLORIDE 9 MG/ML
10 INJECTION INTRAMUSCULAR; INTRAVENOUS; SUBCUTANEOUS
Status: COMPLETED | OUTPATIENT
Start: 2020-01-01 | End: 2020-01-01

## 2020-01-01 RX ORDER — GUAIFENESIN 100 MG/5ML
81 LIQUID (ML) ORAL DAILY
Status: DISCONTINUED | OUTPATIENT
Start: 2020-01-01 | End: 2020-01-01 | Stop reason: HOSPADM

## 2020-01-01 RX ADMIN — IOPAMIDOL 80 ML: 755 INJECTION, SOLUTION INTRAVENOUS at 10:09

## 2020-01-01 RX ADMIN — ATORVASTATIN CALCIUM 80 MG: 40 TABLET, FILM COATED ORAL at 21:49

## 2020-01-01 RX ADMIN — PERFLUTREN 2 ML: 6.52 INJECTION, SUSPENSION INTRAVENOUS at 10:15

## 2020-01-01 RX ADMIN — HEPARIN SODIUM 12 UNITS/KG/HR: 10000 INJECTION, SOLUTION INTRAVENOUS at 13:20

## 2020-01-01 RX ADMIN — SODIUM CHLORIDE 10 ML: 9 INJECTION INTRAMUSCULAR; INTRAVENOUS; SUBCUTANEOUS at 10:15

## 2020-01-01 RX ADMIN — ASPIRIN 300 MG: 600 SUPPOSITORY RECTAL at 11:22

## 2020-01-01 RX ADMIN — DEXTROSE MONOHYDRATE AND SODIUM CHLORIDE 50 ML/HR: 5; .45 INJECTION, SOLUTION INTRAVENOUS at 11:53

## 2020-01-01 RX ADMIN — HEPARIN SODIUM 597.2 UNITS/HR: 10000 INJECTION, SOLUTION INTRAVENOUS at 07:58

## 2020-01-01 RX ADMIN — ASPIRIN 300 MG: 600 SUPPOSITORY RECTAL at 12:26

## 2020-01-01 RX ADMIN — HEPARIN SODIUM 3480 UNITS: 1000 INJECTION, SOLUTION INTRAVENOUS; SUBCUTANEOUS at 13:05

## 2020-01-01 RX ADMIN — DEXTROSE MONOHYDRATE AND SODIUM CHLORIDE 50 ML/HR: 5; .45 INJECTION, SOLUTION INTRAVENOUS at 17:14

## 2020-01-01 RX ADMIN — MORPHINE SULFATE 5 MG: 20 SOLUTION ORAL at 13:20

## 2020-01-01 RX ADMIN — APIXABAN 5 MG: 2.5 TABLET, FILM COATED ORAL at 17:35

## 2020-01-01 RX ADMIN — CEFTRIAXONE SODIUM 1 G: 1 INJECTION, POWDER, FOR SOLUTION INTRAMUSCULAR; INTRAVENOUS at 14:12

## 2020-01-01 RX ADMIN — ASPIRIN 300 MG: 600 SUPPOSITORY RECTAL at 09:07

## 2020-01-01 RX ADMIN — LORAZEPAM 0.5 MG: 2 CONCENTRATE ORAL at 13:20

## 2020-01-01 RX ADMIN — DEXTROSE MONOHYDRATE AND SODIUM CHLORIDE 50 ML/HR: 5; .45 INJECTION, SOLUTION INTRAVENOUS at 11:22

## 2020-01-01 RX ADMIN — CEFTRIAXONE SODIUM 1 G: 1 INJECTION, POWDER, FOR SOLUTION INTRAMUSCULAR; INTRAVENOUS at 14:49

## 2020-02-04 NOTE — PROGRESS NOTES
Lester Page presents today for   Chief Complaint   Patient presents with    Cardiomyopathy     3 MONTH F/U - NO CARDIAC COMPLAINTS       Lester Page preferred language for health care discussion is english/other. Is someone accompanying this pt? yes    Is the patient using any DME equipment during OV? yes    Depression Screening:  3 most recent PHQ Screens 6/3/2019   PHQ Not Done -   Little interest or pleasure in doing things Not at all   Feeling down, depressed, irritable, or hopeless Not at all   Total Score PHQ 2 0       Learning Assessment:  Learning Assessment 6/3/2019   PRIMARY LEARNER Patient   HIGHEST LEVEL OF EDUCATION - PRIMARY LEARNER  -   PRIMARY LANGUAGE ENGLISH   LEARNER PREFERENCE PRIMARY DEMONSTRATION   ANSWERED BY patient   RELATIONSHIP SELF       Abuse Screening:  Abuse Screening Questionnaire 6/3/2019   Do you ever feel afraid of your partner? N   Are you in a relationship with someone who physically or mentally threatens you? N   Is it safe for you to go home? Y       Fall Risk  Fall Risk Assessment, last 12 mths 6/3/2019   Able to walk? Yes   Fall in past 12 months? No       Pt currently taking Anticoagulant therapy? no    Coordination of Care:  1. Have you been to the ER, urgent care clinic since your last visit? Hospitalized since your last visit? no    2. Have you seen or consulted any other health care providers outside of the [address] [place] [address] since your last visit? Include any pap smears or colon screening.  no

## 2020-02-04 NOTE — PROGRESS NOTES
HISTORY OF PRESENT ILLNESS  Christopher Ward is a 80 y.o. female. HPI  She has been doing reasonably well. She does have some memory loss but still looks good for her age. She came here in a wheelchair with her daughter. She has had no chest pain, dyspnea, orthopnea, PND. She denies palpitations, dizziness or syncope. She has had no symptoms to indicate TIA or amaurosis fugax. The interrogation of her ICD demonstrated normal OptiVol. She did have OptiVol spike in December but that has come down to normal. There was no significant ventricular or atrial arrhythmia. There is approximately four months left on the battery. She has a history of a dilated cardiomyopathy with EF in the range of 25% by echocardiogram. She also has moderate to severe mitral regurgitation and pulmonary hypertension documented by echocardiogram in October 2001. The possibility of ischemic heart disease was entertained because of increased risk factors for coronary artery disease, but her thallium myocardial scanning in January 2002 revealed no evidence of ischemia, and it was felt that her cardiomyopathy was a nonischemic-type cardiomyopathy. She has been on a beta blocker, ACE inhibitor, and digoxin and has done well, with no symptoms. A repeat echocardiogram in March 2006 revealed diminished LV function with EF less than 25%. The mitral regurgitation remains to be mildly to moderately severe. She underwent ICD implantation on June 8, 2006, at which time it was intended to place biventricular leads for resynchronization therapy, particularly in light of her wide QRS complex. Unfortunately, after completion of the procedure, the left ventricular lead was noted to have dislodged; therefore, it was removed. She did receive the device capable of cardiac resynchronization therapy for the future. She ultimately had implantation of the left ventricular lead on November 11, 2009, along with the generator change.   She was admitted to [facility] on 07/04/16 with urosepsis and metabolic encephalopathy. She had a repeat echocardiogram on 07/01/16, which demonstrated severe LV dysfunction with EF in the 20% range. PA pressure was estimated at 41 mmHg. The left atrium was severely dilated and the mitral valve showed moderate regurgitation. Review of Systems   Constitutional: Negative for malaise/fatigue and weight loss. HENT: Positive for hearing loss. Eyes: Negative for blurred vision and double vision. Respiratory: Negative for shortness of breath. Cardiovascular: Negative for chest pain, palpitations, orthopnea, claudication, leg swelling and PND. Gastrointestinal: Negative for blood in stool, heartburn and melena. Genitourinary: Negative for dysuria, frequency, hematuria and urgency. Musculoskeletal: Negative for back pain and joint pain. Skin: Negative for itching and rash. Neurological: Negative for dizziness and loss of consciousness. Psychiatric/Behavioral: Positive for memory loss. Negative for depression. Physical Exam   Constitutional: She is oriented to person, place, and time. She appears well-developed and well-nourished. HENT:   Head: Normocephalic and atraumatic. Eyes: Pupils are equal, round, and reactive to light. Conjunctivae are normal.   Neck: Normal range of motion. Neck supple. No JVD present. Cardiovascular: Normal rate, regular rhythm, S1 normal and S2 normal.  No extrasystoles are present. PMI is not displaced. Exam reveals no gallop and no friction rub. Murmur heard. High-pitched blowing early systolic murmur is present with a grade of 1/6 at the apex. Pulses:       Carotid pulses are 3+ on the right side and 3+ on the left side. Pulmonary/Chest: Effort normal. She has no rales. Abdominal: Soft. There is no abdominal tenderness. Musculoskeletal:         General: No edema. Neurological: She is alert and oriented to person, place, and time.  No cranial nerve deficit. Skin: Skin is warm and dry. Psychiatric: She has a normal mood and affect. Her behavior is normal.     Visit Vitals  /62   Pulse 70   Ht 5' 5\" (1.651 m)   SpO2 95%   BMI 21.30 kg/m²       Past Medical History:   Diagnosis Date    Bursitis of both shoulders     Cardiac echocardiogram, abnormal 2016    Definity enhanced. EF 20% (prev 35% on study of 12). Severe diffuse hypk. No mass. RVSP 41 mmHg. Severe LAE. Mod MR. Mod TR.  Cardiac thallium stress test 2002    Mild anteroseptal scar w/o ongoing ischemia. Appearance of dilated cardiomyopathy.   LVE w/stress & rest.      Contact dermatitis and other eczema, due to unspecified cause     Dilated cardiomyopathy ([address]     Goiter     or thyroid mass    Heart attack ([address]     Hypercholesterolemia     Hypertension     Mitral regurgitation     Mitral valve disorders(424.0)     Osteoarthritis of both knees     Pacemaker     S/P ICD (internal cardiac defibrillator) procedure     SOB (shortness of breath)     Thyroid disorder     Wears glasses        Social History     Socioeconomic History    Marital status:      Spouse name: Not on file    Number of children: Not on file    Years of education: Not on file    Highest education level: Not on file   Occupational History    Not on file   Social Needs    Financial resource strain: Not on file    Food insecurity:     Worry: Not on file     Inability: Not on file    Transportation needs:     Medical: Not on file     Non-medical: Not on file   Tobacco Use    Smoking status: Former Smoker     Packs/day: 1.00     Last attempt to quit: 1998     Years since quittin.6    Smokeless tobacco: Never Used   Substance and Sexual Activity    Alcohol use: No    Drug use: No    Sexual activity: Not on file   Lifestyle    Physical activity:     Days per week: Not on file     Minutes per session: Not on file    Stress: Not on file   Relationships    Social connections:     Talks on phone: Not on file     Gets together: Not on file     Attends Baptism service: Not on file     Active member of club or organization: Not on file     Attends meetings of clubs or organizations: Not on file     Relationship status: Not on file    Intimate partner violence:     Fear of current or ex partner: Not on file     Emotionally abused: Not on file     Physically abused: Not on file     Forced sexual activity: Not on file   Other Topics Concern    Not on file   Social History Narrative    Not on file       Family History   Problem Relation Age of Onset    Diabetes Other     Heart Disease Other     Hypertension Other     Arthritis-osteo Other        Past Surgical History:   Procedure Laterality Date    HX HEART CATHETERIZATION      HX PACEMAKER  6/2006    ICD implantation    HX PACEMAKER  11/09    Upgrade of ICD to biventricular ICD, with left lead implantation and generator change.  INITIAL ICD GENERATOR/LEAD EVAL         Current Outpatient Medications   Medication Sig Dispense Refill    spironolactone (ALDACTONE) 25 mg tablet TAKE 1 TABLET BY MOUTH ONCE DAILY 90 Tab 3    atorvastatin (LIPITOR) 20 mg tablet Take  by mouth daily.  LORazepam (ATIVAN) 0.5 mg tablet Take 1 Tab by mouth every four (4) hours as needed for Anxiety. Max Daily Amount: 3 mg. (Patient taking differently: Take 1 mg by mouth every four (4) hours as needed for Anxiety.) 15 Tab 0    digoxin (LANOXIN) 0.125 mg tablet Take 1 Tab by mouth daily. 30 Tab 11    LORATADINE/PSEUDOEPHEDRINE SUL (CLARITIN-D 24 HOUR PO) Take  by mouth as needed.  METOPROLOL SUCCINATE (TOPROL XL PO) Take 100 mg by mouth daily.  FUROSEMIDE (LASIX PO) Take 40 mg by mouth daily.  LEVOTHYROXINE SODIUM (SYNTHROID PO) Take 100 mcg by mouth daily. EKG: unchanged from previous tracings, 1:1 Biv.pacing  . ASSESSMENT and PLAN  Encounter Diagnoses   Name Primary?  Dilated cardiomyopathy, EF 35%.  Yes    Mitral valve disorder,MR     ICD implantation in June 2006/upgrade with left ventricular lead placement and generator change in November 2009. She continues to do well. She has had no symptoms to indicate angina or cardiac decompensation. She did have some OptiVol spike in December but her OptiVol is normal now. She has had no significant cardiac arrhythmia. Mitral regurgitation seems to be mild by auscultation. She has four months left on the battery and her daughter wishes to replace the generator one more time at this time and we will proceed with this when the time comes.

## 2020-02-11 NOTE — PROGRESS NOTES
I have personally seen and evaluated the device findings. Interrogation reviewed and I agree with assessment.     Bereket Marshall

## 2020-03-14 PROBLEM — I26.99 PULMONARY EMBOLI (HCC): Status: ACTIVE | Noted: 2020-01-01

## 2020-03-14 PROBLEM — R77.8 ELEVATED TROPONIN: Status: ACTIVE | Noted: 2020-01-01

## 2020-03-14 PROBLEM — I21.4 NSTEMI (NON-ST ELEVATED MYOCARDIAL INFARCTION) (HCC): Status: ACTIVE | Noted: 2020-01-01

## 2020-03-14 PROBLEM — I63.9 CVA (CEREBRAL VASCULAR ACCIDENT) (HCC): Status: ACTIVE | Noted: 2020-01-01

## 2020-03-14 NOTE — ED NOTES
EMS reports, \"PT transported from home. 08:00 this morning last known well. L sided facial droop, couldn't move left arm. Very slurred speech, incomprehensible. Family states speech is normally normal. History of dementia. BG= 109\". Unable to speak.

## 2020-03-14 NOTE — PROGRESS NOTES
Problem: Patient Education: Go to Patient Education Activity Goal: Patient/Family Education Outcome: Progressing Towards Goal 
  
Problem: TIA/CVA Stroke: 0-24 hours Goal: Activity/Safety Outcome: Progressing Towards Goal 
  
Problem: Falls - Risk of 
Goal: *Absence of Falls Description: Document Kathleen Bryan Fall Risk and appropriate interventions in the flowsheet. Outcome: Progressing Towards Goal 
Note: Fall Risk Interventions: No skid foot wear, bed in lowest position and locked, call bell within reach. Mentation Interventions: Bed/chair exit alarm, Adequate sleep, hydration, pain control, More frequent rounding Medication Interventions: Bed/chair exit alarm Elimination Interventions: Bed/chair exit alarm, Call light in reach Problem: Injury - Risk of, Adverse Drug Event Goal: *Absence of adverse drug events Outcome: Progressing Towards Goal 
  
Problem: Pain Goal: *Control of Pain Outcome: Progressing Towards Goal

## 2020-03-14 NOTE — PROGRESS NOTES
3/14/2020 13:48 Patient can not have the STAT MRI of the Brain due to the VIVA XT CRT-D that is NOT MRI conditional. 
 
Thank you, Dominic PATRICIA RT (R) MR

## 2020-03-14 NOTE — H&P
Hospitalist Admission History and Physical 
 
NAME:  Cyril Lewis :   1932 MRN:   [id number] PCP:  Renee Hdz MD 
Date/Time:  3/14/2020 11:42 AM 
Subjective: CHIEF COMPLAINT: slurred speech, left facial droop and left sided weakness this morning at 815-830am  
 
HISTORY OF PRESENT ILLNESS:    
Ms. Vincent Mckeon is an 79 yo AA female with a past medical hx of HTN, HLD, dilated CMO,  S/p ICD, hypothyroidism and Alzheimer's dementia who presented from home with slurred speech, left facial droop and left sided weakness at 815-830am this morning. All of the history is obtained from patient's daughter at the bedside Ms. John Bensno as patient is aphasic. Ms. John Benson went into patient's bedroom this morning and found pt in bed, aphasic, with left sided facial droop, and bilateral upper extremity weakness. Patient was not tracking daughter with her eyes. Last known normal was 930pm last night. EMS was called. Prior to this event, daughter states pt was in her usual state of health- no fever, chills, headaches, myalgias, chest pain, palpitations, SOB, KING, abd pain, n/v, diarrhea, constipation, dysuria. At baseline, patient is bedbound and needs wheelchair to get around. She has b/l knee arthritis which prevents her from ambulation. She can move both arms and legs at baseline. She can feed herself but needs assistance from her daughter for most day to day activities. At baseline ox3. conversant and coherent . Patient was evaluated by [facility] in the ED. Not a tpa candidate because pt is outside of the window. Not a thrombectomy candidate because mRS =3. Recommended admission with MRI brain w/o contrast v. Repeat head CT in 24 hours, depends if her ICD is MRI compatible. Neuro Dr. Ben Morse was consulted and cleared for heparin administration. ED waiting to hear back from Cardiology.    
 
To note, her ICD was interrogated recently and demonstrated normal OptiVol. No significant ventricular or atrail arrhythmia per 's note. ED course: - Vital signs: temp is not documented, HR 60s, /63, RR 19, 98% on room air - Labs remarkable for: troponin 1.38  
- Imagin. Ct head wo contrast- no acute findings. 2. CTA head neck wo contrast : Acute superior M2 division LMCA occlusion.  
- EKG: AV dual paced rhythm - Patient received:   mg, heparin bolus and heparin drip - past medical hx- see above in HPI 
- no recent surgeries 
- allergies- PCN 
- med rec- I have reviewed all home meds with daughter at bedside. See below. No ASA at home  
- social hx- lives with daughter, bedbound, need wheelchair, denies recreational drugs. Denies etoh. Quit tobacco 30 yrs ago. - Specialists: CARDIOLOGY Dr. Luis Moore Past Medical History:  
Diagnosis Date  Bursitis of both shoulders  Cardiac echocardiogram, abnormal 2016 Definity enhanced. EF 20% (prev 35% on study of 12). Severe diffuse hypk. No mass. RVSP 41 mmHg. Severe LAE. Mod MR. Mod TR.  Cardiac thallium stress test 2002 Mild anteroseptal scar w/o ongoing ischemia. Appearance of dilated cardiomyopathy. LVE w/stress & rest.    
 Contact dermatitis and other eczema, due to unspecified cause  Dilated cardiomyopathy ([address]  Goiter   
 or thyroid mass  Heart attack ([address]  Hypercholesterolemia  Hypertension  Mitral regurgitation  Mitral valve disorders(424.0)  Osteoarthritis of both knees  Pacemaker  S/P ICD (internal cardiac defibrillator) procedure  SOB (shortness of breath)  Thyroid disorder  Wears glasses Past Surgical History:  
Procedure Laterality Date  HX HEART CATHETERIZATION    
 HX PACEMAKER  2006 ICD implantation  HX PACEMAKER   Upgrade of ICD to biventricular ICD, with left lead implantation and generator change.   
 INITIAL ICD GENERATOR/LEAD EVAL    
 
 
 Social History Tobacco Use  Smoking status: Former Smoker Packs/day: 1.00 Last attempt to quit: 1998 Years since quittin.7  Smokeless tobacco: Never Used Substance Use Topics  Alcohol use: No  
  
 
Family History Problem Relation Age of Onset  Diabetes Other  Heart Disease Other  Hypertension Other  Arthritis-osteo Other Allergies Allergen Reactions  Phenobarbital Unknown (comments) Prior to Admission Medications Prescriptions Last Dose Informant Patient Reported? Taking? FUROSEMIDE (LASIX PO)   Yes No  
Sig: Take 40 mg by mouth daily. LEVOTHYROXINE SODIUM (SYNTHROID PO)   Yes No  
Sig: Take 100 mcg by mouth daily. LORATADINE/PSEUDOEPHEDRINE SUL (CLARITIN-D 24 HOUR PO)   Yes No  
Sig: Take  by mouth as needed. LORazepam (ATIVAN) 0.5 mg tablet   No No  
Sig: Take 1 Tab by mouth every four (4) hours as needed for Anxiety. Max Daily Amount: 3 mg. Patient taking differently: Take 1 mg by mouth every four (4) hours as needed for Anxiety. METOPROLOL SUCCINATE (TOPROL XL PO)   Yes No  
Sig: Take 100 mg by mouth daily. atorvastatin (LIPITOR) 20 mg tablet   Yes No  
Sig: Take  by mouth daily. digoxin (LANOXIN) 0.125 mg tablet   No No  
Sig: Take 1 Tab by mouth daily. spironolactone (ALDACTONE) 25 mg tablet   No No  
Sig: TAKE 1 TABLET BY MOUTH ONCE DAILY Facility-Administered Medications: None REVIEW OF SYSTEMS:   
 [x] Unable to obtain  ROS due to  [x]mental status change APHASIA  []sedated   []intubated 
 in usual state of health per daughter Objective: VITALS:   
There were no vitals taken for this visit. No data recorded. PHYSICAL EXAM:  
General:    Frail AA elderly woman laying in bed, no acute distress, appears listed age. Head:   Normocephalic, without obvious abnormality Eyes:   Conjunctivae clear, anicteric sclerae, pupils are equal 
Nose:  Nares normal, no drainage Throat:    Lips normal. Oral mucosa deferred as pt not opening mouth on command. Neck:  Supple, symmetrical, no JVD. Lungs: On room air,  clear to auscultation bilaterally- no wheezing, rhonchi or rales Chest wall:  No tenderness or deformity. No Accessory muscle use. 
+ ICD in left chest wall upper Heart:   Regular rate and rhythm;  no murmur, rub or gallop. Abdomen:   Soft, non-tender. Not distended. Bowel sounds normal. No masses Extremities: No LE edema. Skin:     No rashes or lesions. Not Jaundiced Psych:  Calm Neurologic: Awake. Eyes are open and tracking me. Does not move legs, does not lift legs on command. Moving arms, does not squeeze my fingers on command. Aphasia. Some moaning. Left sided facial droop, corner of left lip drooping. LAB DATA REVIEWED:   
No components found for: [place] Recent Labs  
  03/14/20 
1019   
K 4.1  CO2 28 BUN 13  
CREA 0.77 GLU 94  
CA 9.0 WBC 7.8 HGB 12.2 HCT 38.0  
 IMAGING RESULTS: 
 []  I have personally reviewed the actual   []CXR  []CT scan CT Results (most recent): 
Results from [facility] Encounter encounter on 03/14/20 CT HEAD WO CONT Narrative EXAM: CT HEAD WO CONT INDICATION: left sided weakness. Stroke alert. COMPARISON: 6/30/2016. TECHNIQUE: Unenhanced CT of the head was performed using 5 mm images. Brain and 
bone windows were generated. CT dose reduction was achieved through use of a 
standardized protocol tailored for this examination and automatic exposure 
control for dose modulation. FINDINGS: 
The ventricles and sulci are prominent but within normal limits for age. In 
size, shape and configuration and midline. Mild periventricular ischemic white 
matter change. There is no intracranial hemorrhage, extra-axial collection, 
mass, mass effect or midline shift. The basilar cisterns are open. No acute 
infarct is identified. The bone windows demonstrate no abnormalities.  The 
 visualized portions of the paranasal sinuses and mastoid air cells are clear. Impression IMPRESSION: Age-related atrophy and mild chronic appearing periventricular 
ischemic white matter change. No acute findings. Results relayed to Dr. Denisa Lima on at 10:00 AM. CTA head neck IMPRESSION: 
1. Acute thromboembolic occlusion of the superior M2 trunk of the LMCA with 
questionable evolving stroke at the left insula. 2.  Prominent soft tissue at the left aortopulmonary region which is 
questionably intraluminal along the superior wall of the left main pulmonary 
artery. Possible nonocclusive pulmonary embolus with extension into a small 
superior segmental branch. 3.  Partially visualized soft tissue prominence in the right hilum which is 
suspicious for lymphadenopathy. Assessment/Plan: Ms. Keiry Camacho is an 79 yo AA female with a past medical hx of HTN, HLD, dilated CMO, S/p ICD, hypothyroidism, Alzheimer's dementia who presented with slurred speech, left facial droop and left sided weakness. She is being admitted for acute stroke as well as for elevated troponin. Consults: Neuro, Cardiology 1. Acute stroke- Acute superior M2 division LMCA occlusion 2. Elevated troponin- differential includes NSTEMI v. Demand ischemia - EKG is paced. Aphasia- cardiac hx not feasible. No cardiopulmonary complaints per family. 3. Concern for acute PE per CTA head neck 4. HTN 
5. HLD 6. Hx of Dilated cardiomyopathy s/p ICD implantation in June 2006 then upgrade with left ventricular lead along with generator change on 11/11/2009 - suppose to have generator changed in June 2020? 7. Moderate to severe mitral regurg 8. Hypothyroidism 9. Alzheimer's dementia 10. Impaired mobility- bedbound at baseline Last ECHO 7/2016:  
Left ventricle: Systolic function was markedly reduced. Ejection fraction was estimated to be 20 %. There was severe diffuse hypokinesis. No mass was identified. Right ventricle: Systolic pressure was mildly increased. Estimated peak pressure was 41 mmHg.  
Left atrium: The atrium was severely dilated. Mitral valve: There was moderate regurgitation. Tricuspid valve: There was moderate regurgitation. 
 
- Cardiology consulted. ED started on heparin drip. Trend troponins. Next troponin is at 4pm. Monitor on telemetry. ECHO ordered. - Neurology consulted. Proceed with stroke protocol (which was ordered by ED MD) including PT/OT/SLP evals, ECHO with bubble study, telemetry monitoring, and daily aspirin and high dose statin. Check A1c and lipid panel in AM  
- will need to determine if ICD is MRI comptaible. If it is, will order MRI brain w/o contrast. If it is NOT, will repeat head ct in 24 hours. - allow for permissive HTN for at least 24 hours, goal SBP <220.  
- stat V/Q scan . Already on heparin drip. 
- stat dopplers of legs to evaluate for DVT   
- on lasix, metoprolol, aldactone and digoxin at home - hold all except for digoxin - Patient takes 0.5 mg ativan at night- will hold for now 
- resume home synthroid. addon thyroid studies.  
- UA is pending. Urine culture pending as well. - will also get routine chest xray given cardiac workup  
- fall and aspiration precautions  
- NPO until slp eval. Start maintenance fluids at 50 cc per hour- as patient has very low ejection fraction. Will watch very closely for any evidence of overload. The assessment and plan were discussed with both daughters Stefani and Via Ruthie Lucas at bedside. All questions answered. Vernestine Finders is ANTONIO- she has signed DNR DNI form. Code status: DNR DNI  
DVT/GI prophylaxis:  heparin drip. NPO until SLP eval.  
Disposition: to be determined.  
__________________________________________________ Risk of deterioration:  []Low    [x]Moderate  []High Care Plan discussed with: 
  [x]Patient   [x]Family    []ED Care Manager  [x]ED Doc DR Edu Juarez   []Specialist : 
 
 Total Time Coordinating Admission:  60    minutes []Total Critical Care Time:    
___________________________________________________ Admitting Physician: GARY Barrett

## 2020-03-14 NOTE — ED PROVIDER NOTES
EMERGENCY DEPARTMENT HISTORY AND PHYSICAL EXAM 
 
9:49 AM 
 
 
Date: 3/14/2020 Patient Name: Niru Medina History of Presenting Illness No chief complaint on file. History Provided By: EMS Additional History (Context): Niru Medina is a 80 y.o. female with hypertension, hyperlipidemia and myocardial infarction who presents with left facial droop and left sided weakness since 8:00 about 1 hour and 45 minutes ago. Patient brought in by EMS they state that she had a marked facial droop that is now resolving. Patient lives with family members. Patient family state patient is normally talkative and communicative. This morning she developed slurred speech with the left-sided weakness. Patient is not speaking at this time. PCP: David Brock MD 
 
 
 
Current Facility-Administered Medications Medication Dose Route Frequency Provider Last Rate Last Dose  aspirin (ASA) suppository 300 mg  300 mg Rectal DAILY Yinka Galeas DO      
 heparin (porcine) 1,000 unit/mL injection 3,480 Units  60 Units/kg (Order-Specific) IntraVENous ONCE Yinka Galeas DO      
 heparin 25,000 units in D5W 250 ml infusion  12-25 Units/kg/hr (Order-Specific) IntraVENous TITRATE Yinka Galeas DO      
 [START ON 3/15/2020] aspirin chewable tablet 81 mg  81 mg Oral DAILY Ryne Burger T, [address]      
 atorvastatin (LIPITOR) tablet 80 mg  80 mg Oral QHS Edita oKhler T, [address] Current Outpatient Medications Medication Sig Dispense Refill  spironolactone (ALDACTONE) 25 mg tablet TAKE 1 TABLET BY MOUTH ONCE DAILY 90 Tab 3  
 atorvastatin (LIPITOR) 20 mg tablet Take  by mouth daily.  LORazepam (ATIVAN) 0.5 mg tablet Take 1 Tab by mouth every four (4) hours as needed for Anxiety. Max Daily Amount: 3 mg.  (Patient taking differently: Take 1 mg by mouth every four (4) hours as needed for Anxiety.) 15 Tab 0  
  digoxin (LANOXIN) 0.125 mg tablet Take 1 Tab by mouth daily. 30 Tab 11  
 LORATADINE/PSEUDOEPHEDRINE SUL (CLARITIN-D 24 HOUR PO) Take  by mouth as needed.  METOPROLOL SUCCINATE (TOPROL XL PO) Take 100 mg by mouth daily.  FUROSEMIDE (LASIX PO) Take 40 mg by mouth daily.  LEVOTHYROXINE SODIUM (SYNTHROID PO) Take 100 mcg by mouth daily. Past History Past Medical History: 
Past Medical History:  
Diagnosis Date  Bursitis of both shoulders  Cardiac echocardiogram, abnormal 2016 Definity enhanced. EF 20% (prev 35% on study of 12). Severe diffuse hypk. No mass. RVSP 41 mmHg. Severe LAE. Mod MR. Mod TR.  Cardiac thallium stress test 2002 Mild anteroseptal scar w/o ongoing ischemia. Appearance of dilated cardiomyopathy. LVE w/stress & rest.    
 Contact dermatitis and other eczema, due to unspecified cause  Dilated cardiomyopathy ([address]  Goiter   
 or thyroid mass  Heart attack ([address]  Hypercholesterolemia  Hypertension  Mitral regurgitation  Mitral valve disorders(424.0)  Osteoarthritis of both knees  Pacemaker  S/P ICD (internal cardiac defibrillator) procedure  SOB (shortness of breath)  Thyroid disorder  Wears glasses Past Surgical History: 
Past Surgical History:  
Procedure Laterality Date  HX HEART CATHETERIZATION    
 HX PACEMAKER  2006 ICD implantation  HX PACEMAKER   Upgrade of ICD to biventricular ICD, with left lead implantation and generator change.  INITIAL ICD GENERATOR/LEAD EVAL Family History: 
Family History Problem Relation Age of Onset  Diabetes Other  Heart Disease Other  Hypertension Other  Arthritis-osteo Other Social History: 
Social History Tobacco Use  Smoking status: Former Smoker Packs/day: 1.00 Last attempt to quit: 1998 Years since quittin.7  Smokeless tobacco: Never Used Substance Use Topics  Alcohol use: No  
 Drug use: No  
 
 
Allergies: Allergies Allergen Reactions  Phenobarbital Unknown (comments) Review of Systems Review of Systems Constitutional: Negative. Negative for chills, diaphoresis and fever. HENT: Negative. Negative for congestion, rhinorrhea and sore throat. Eyes: Negative. Negative for pain, discharge and redness. Respiratory: Negative. Negative for cough, chest tightness, shortness of breath and wheezing. Cardiovascular: Negative. Negative for chest pain. Gastrointestinal: Negative. Negative for abdominal pain, constipation, diarrhea, nausea and vomiting. Genitourinary: Negative. Negative for dysuria, flank pain, frequency, hematuria and urgency. Musculoskeletal: Negative. Negative for back pain and neck pain. Skin: Negative. Negative for rash. Neurological: Positive for facial asymmetry, speech difficulty and weakness. Negative for syncope, numbness and headaches. Psychiatric/Behavioral: Negative. All other systems reviewed and are negative. Physical Exam  
 
Visit Vitals /56 Pulse 60 Resp 13 SpO2 100% Physical Exam 
Constitutional:   
   General: She is awake. She is not in acute distress. Appearance: Normal appearance. She is well-developed. She is not ill-appearing, toxic-appearing or diaphoretic. HENT:  
   Head: Normocephalic and atraumatic. Nose: Nose normal.  
   Comments: Right-sided nasal trumpet in place Mouth/Throat:  
   Pharynx: No oropharyngeal exudate. Eyes:  
   General: No scleral icterus. Conjunctiva/sclera: Conjunctivae normal.  
   Pupils: Pupils are equal, round, and reactive to light. Neck: Musculoskeletal: Normal range of motion and neck supple. Thyroid: No thyromegaly. Vascular: No hepatojugular reflux or JVD. Trachea: No tracheal deviation. Cardiovascular: Rate and Rhythm: Normal rate and regular rhythm. Pulses: Normal pulses. Radial pulses are 2+ on the right side and 2+ on the left side. Dorsalis pedis pulses are 2+ on the right side and 2+ on the left side. Heart sounds: Normal heart sounds, S1 normal and S2 normal. No murmur. No gallop. No S3 or S4 sounds. Pulmonary:  
   Effort: Pulmonary effort is normal. No respiratory distress. Breath sounds: Normal breath sounds. No decreased breath sounds, wheezing, rhonchi or rales. Comments: Sonorous. Abdominal:  
   General: Abdomen is flat. Bowel sounds are normal. There is no distension. Palpations: Abdomen is soft. Abdomen is not rigid. There is no mass. Tenderness: There is no abdominal tenderness. There is no guarding or rebound. Negative signs include Velez's sign and McBurney's sign. Musculoskeletal: Normal range of motion. Lymphadenopathy:  
   Head:  
   Right side of head: No submental, submandibular, preauricular or occipital adenopathy. Left side of head: No submental, submandibular, preauricular or occipital adenopathy. Cervical: No cervical adenopathy. Upper Body:  
   Right upper body: No supraclavicular adenopathy. Left upper body: No supraclavicular adenopathy. Skin: 
   General: Skin is warm and dry. Findings: No rash. Neurological:  
   Mental Status: She is alert. She is not disoriented. GCS: GCS eye subscore is 4. GCS verbal subscore is 5. GCS motor subscore is 6. Cranial Nerves: No cranial nerve deficit. Sensory: Sensory deficit present. Motor: Weakness present. Coordination: Coordination normal.  
   Gait: Gait normal.  
   Deep Tendon Reflexes: Reflexes are normal and symmetric. Psychiatric:     
   Speech: Speech normal.     
   Behavior: Behavior normal.     
   Thought Content: Thought content normal.     
   Judgment: Judgment normal.  
 
 
 
 
Diagnostic Study Results Labs - 
 Recent Results (from the past 12 hour(s)) METABOLIC PANEL, BASIC Collection Time: 03/14/20  9:48 AM  
Result Value Ref Range Sodium 143 136 - 145 mmol/L Potassium 4.1 3.5 - 5.5 mmol/L Chloride 111 100 - 111 mmol/L  
 CO2 28 21 - 32 mmol/L Anion gap 4 3.0 - 18 mmol/L Glucose 94 74 - 99 mg/dL BUN 13 7.0 - 18 MG/DL Creatinine 0.77 0.6 - 1.3 MG/DL  
 BUN/Creatinine ratio 17 12 - 20 GFR est AA >60 >60 ml/min/1.73m2 GFR est non-AA >60 >60 ml/min/1.73m2 Calcium 9.0 8.5 - 10.1 MG/DL  
CBC WITH AUTOMATED DIFF Collection Time: 03/14/20  9:48 AM  
Result Value Ref Range WBC 7.8 4.6 - 13.2 K/uL  
 RBC 4.26 4.20 - 5.30 M/uL  
 HGB 12.2 12.0 - 16.0 g/dL HCT 38.0 35.0 - 45.0 % MCV 89.2 74.0 - 97.0 FL  
 MCH 28.6 24.0 - 34.0 PG  
 MCHC 32.1 31.0 - 37.0 g/dL  
 RDW 16.0 (H) 11.6 - 14.5 % PLATELET 812 928 - 894 K/uL MPV 12.4 (H) 9.2 - 11.8 FL  
 NEUTROPHILS 53 40 - 73 % LYMPHOCYTES 36 21 - 52 % MONOCYTES 9 3 - 10 % EOSINOPHILS 2 0 - 5 % BASOPHILS 0 0 - 2 %  
 ABS. NEUTROPHILS 4.1 1.8 - 8.0 K/UL  
 ABS. LYMPHOCYTES 2.8 0.9 - 3.6 K/UL  
 ABS. MONOCYTES 0.7 0.05 - 1.2 K/UL  
 ABS. EOSINOPHILS 0.2 0.0 - 0.4 K/UL  
 ABS. BASOPHILS 0.0 0.0 - 0.1 K/UL  
 DF AUTOMATED CARDIAC PANEL,(CK, CKMB & TROPONIN) Collection Time: 03/14/20  9:48 AM  
Result Value Ref Range CK 46 26 - 192 U/L  
 CK - MB <1.0 <3.6 ng/ml CK-MB Index  0.0 - 4.0 % CALCULATION NOT PERFORMED WHEN RESULT IS BELOW LINEAR LIMIT Troponin-I, QT 1.38 (H) 0.0 - 0.045 NG/ML  
EKG, 12 LEAD, INITIAL Collection Time: 03/14/20 10:22 AM  
Result Value Ref Range Ventricular Rate 60 BPM  
 Atrial Rate 93 BPM  
 P-R Interval 158 ms QRS Duration 194 ms Q-T Interval 446 ms  
 QTC Calculation (Bezet) 446 ms Calculated P Axis 117 degrees Calculated R Axis -29 degrees Calculated T Axis 64 degrees Diagnosis AV dual-paced rhythm Abnormal ECG 
 When compared with ECG of 02-MAY-2018 14:33, 
Vent. rate has decreased BY  25 BPM 
  
 
 
Radiologic Studies -  
CT HEAD WO CONT Final Result IMPRESSION: Age-related atrophy and mild chronic appearing periventricular  
ischemic white matter change. No acute findings. Results relayed to Dr. Maddie Schultz on at 10:00 AM. CTA HEAD NECK W WO CONT    (Results Pending) Medical Decision Making Provider Notes (Medical Decision Making): MDM Number of Diagnoses or Management Options Cerebrovascular accident (CVA) due to occlusion of precerebral artery ([address]): NSTEMI (non-ST elevated myocardial infarction) [facility]):  
Diagnosis management comments: This patient is a 59-year-old -American female with history of left-sided weakness significant for possible stroke. Patient was seen to also have elevated cardiac enzymes. Heparin was held until cleared by neurology. In-house neurology contacted and stated that heparin can be given. We are still awaiting cardiac callback. I am the first provider for this patient. I reviewed the vital signs, available nursing notes, past medical history, past surgical history, family history and social history. Vital Signs-Reviewed the patient's vital signs. Records Reviewed: Nursing Notes (Time of Review: 9:49 AM) ED Course: Progress Notes, Reevaluation, and Consults: 
 
Labs essentially normal with the exception of cardiac enzyme of 1.38. Chest X-Ray showed No acute process. EKG showed paced rhythm at a rate of 60 bpm. With no ST elevations or depression and non specific T wave changes. CT head showed no acute process. CTA head and neck showed no acute thromboembolic occlusion on the left MCA. 
2:49 AM 3/14/2020 Consult:  Discussed care with Dr Mariola Benjamin Telenuerologist. Standard discussion; including history of patients chief complaint, available diagnostic results, and treatment course. Agrees with admission.   He is aware of the negative CT head. And a CTA that is positive for acute MCA occlusion. 10:58 AM 
 
Consult:  Discussed care with Dr Kimberly Palomares. Hospitalist. Standard discussion; including history of patients chief complaint, available diagnostic results, and treatment course. Agrees with admission. Would like to hold heparin and contact in-house neurology. 11:39 AM 
 
Consult:  Discussed care with Dr Dave Barrett. Neurology. Standard discussion; including history of patients chief complaint, available diagnostic results, and treatment course. He will see the patient. Does not see a problem with giving heparin for the elevated troponin. 11:49 AM 
 
Consult:  Discussed care with Dr Leach. Cardiology. Standard discussion; including history of patients chief complaint, available diagnostic results, and treatment course. Will see patient. 12:12 PM 
 
 
2. Aspirin: Aspirin was given Diagnosis Clinical Impression: 1. Cerebrovascular accident (CVA) due to occlusion of precerebral artery ([facility] [facility] 2. NSTEMI (non-ST elevated myocardial infarction) ([facility] Disposition: Admitted Attestation Provider Attestation:    
I personally performed the services described in the documentation, reviewed the documentation and it accurately and completely records my words and actions utilizing the [employer] March 14, 2020 at 12:02 PM - Rosangela Galeas DO Disclaimer. It is dictated using utilizing voice recognition software. Unfortunately this leads to occasional typographical errors. I apologize in advance if the situation occurs. If questions arise please do not hesitate to contact me or call our department.

## 2020-03-14 NOTE — ED NOTES
Please contact Lucero Joe at [phone] when a bed becomes available. Family would like to be updated when a room becomes available.

## 2020-03-14 NOTE — ED NOTES
TRANSFER - OUT REPORT: 
 
Verbal report given to Tray Mendoza, RN name) on Mike Motta  being transferred to  [address], Room 421 (unit) for routine progression of care Report consisted of patients Situation, Background, Assessment and  
Recommendations(SBAR). Information from the following report(s) SBAR, Kardex, ED Summary, Intake/Output, MAR, Recent Results and Med Rec Status was reviewed with the receiving nurse. Lines:  
Peripheral IV 03/14/20 Left Antecubital (Active) Site Assessment Clean, dry, & intact 3/14/2020 10:00 AM  
Phlebitis Assessment 0 3/14/2020 10:00 AM  
Infiltration Assessment 0 3/14/2020 10:00 AM  
Dressing Status Clean, dry, & intact 3/14/2020 10:00 AM  
Dressing Type Transparent 3/14/2020 10:00 AM  
Hub Color/Line Status Pink;Flushed;Patent 3/14/2020 10:00 AM  
   
Peripheral IV 03/14/20 Left Hand (Active) Site Assessment Clean, dry, & intact 3/14/2020  1:17 PM  
Phlebitis Assessment 0 3/14/2020  1:17 PM  
Infiltration Assessment 0 3/14/2020  1:17 PM  
Dressing Status Clean, dry, & intact 3/14/2020  1:17 PM  
Dressing Type Transparent 3/14/2020  1:17 PM  
Hub Color/Line Status Blue;Flushed;Patent 3/14/2020  1:17 PM  
  
 
Opportunity for questions and clarification was provided. Patient transported with: 
 Monitor Registered Nurse Tech

## 2020-03-15 NOTE — PROGRESS NOTES
0630 Lab called in regards to PTT due. Lab reported technician in route to draw labs. Will follow up.

## 2020-03-15 NOTE — PROGRESS NOTES
OT orders received and chart reviewed. Patient pending LE duplex to r/o DVT, will follow up once results are known. Thank you.  
Lokesh Dawn OTR/L

## 2020-03-15 NOTE — CONSULTS
HPI: 19-year-old female with history of hypertension, dilated hyper cardiomyopathy, hyperlipidemia, hypothyroidism, AICD, and a baseline reportedly Alzheimer's dementia admitted on  admitted from her home with symptoms of slurred speech, left facial droop, and left-sided weakness. She reportedly according to family was not speaking and not tracking appropriately. Patient at baseline is mostly bedridden, needing a wheelchair to get around with limitation, she has severe bilateral knee DJD. She also is only able to feed herself needing assistance from her daughter for most of her activities of daily living. She was found to have elevated troponins raising concern for an MI and she is on a heparin drip for this. She had a CT of the head which I have personally reviewed and which showed age-related atrophy with mild chronic periventricular white matter changes and no acute findings on the CT. A CT angiogram showed no evidence of carotid stenosis, but according to radiology she had an acute thromboembolic occlusion of the superior M2 trunk of the left MCA with \"questionable evolving stroke at the left insula\". Of note, the CT angiogram and a CT of the head were read by 2 different radiologists. Social History Socioeconomic History  Marital status:  Spouse name: Not on file  Number of children: Not on file  Years of education: Not on file  Highest education level: Not on file Occupational History  Not on file Social Needs  Financial resource strain: Not on file  Food insecurity Worry: Not on file Inability: Not on file  Transportation needs Medical: Not on file Non-medical: Not on file Tobacco Use  Smoking status: Former Smoker Packs/day: 1.00 Last attempt to quit: 1998 Years since quittin.7  Smokeless tobacco: Never Used Substance and Sexual Activity  Alcohol use: No  
 Drug use:  No  
  Sexual activity: Not on file Lifestyle  Physical activity Days per week: Not on file Minutes per session: Not on file  Stress: Not on file Relationships  Social connections Talks on phone: Not on file Gets together: Not on file Attends Confucianist service: Not on file Active member of club or organization: Not on file Attends meetings of clubs or organizations: Not on file Relationship status: Not on file  Intimate partner violence Fear of current or ex partner: Not on file Emotionally abused: Not on file Physically abused: Not on file Forced sexual activity: Not on file Other Topics Concern  Not on file Social History Narrative  Not on file Family History Problem Relation Age of Onset  Diabetes Other  Heart Disease Other  Hypertension Other  Arthritis-osteo Other Current Facility-Administered Medications Medication Dose Route Frequency Provider Last Rate Last Dose  aspirin (ASA) suppository 300 mg  300 mg Rectal DAILY Lucio Galeas DO   300 mg at 03/15/20 3104  heparin 25,000 units in D5W 250 ml infusion  12-25 Units/kg/hr (Order-Specific) IntraVENous TITRATE Roxana Galeas DO 5 mL/hr at 03/15/20 0729 497.2 Units/hr at 03/15/20 0508  aspirin chewable tablet 81 mg  81 mg Oral DAILY Ryne Grant [place]   Stopped at 03/15/20 0900  
 atorvastatin (LIPITOR) tablet 80 mg  80 mg Oral QHS Bradford Bonilla   Stopped at 03/14/20 2200  digoxin (LANOXIN) tablet 0.125 mg  0.125 mg Oral DAILY Bradford Bonilla   Stopped at 03/15/20 0900  levothyroxine (SYNTHROID) tablet 100 mcg  100 mcg Oral DAILY Bradford Stubbs   Stopped at 03/15/20 0900  acetaminophen (TYLENOL) suppository 325 mg  325 mg Rectal Q4H PRN GARY Bonilla      
 dextrose 5 % - 0.45% NaCl infusion  50 mL/hr IntraVENous CONTINUOUS Bradford Bonilla 50 mL/hr at 03/15/20 0731 50 mL/hr at 03/15/20 770  labetaloL (NORMODYNE;TRANDATE) 20 mg/4 mL (5 mg/mL) injection 10 mg  10 mg IntraVENous Q6H PRN Bradford Alba Past Medical History:  
Diagnosis Date  Bursitis of both shoulders  Cardiac echocardiogram, abnormal 07/01/2016 Definity enhanced. EF 20% (prev 35% on study of 9/5/12). Severe diffuse hypk. No mass. RVSP 41 mmHg. Severe LAE. Mod MR. Mod TR.  Cardiac thallium stress test 01/07/2002 Mild anteroseptal scar w/o ongoing ischemia. Appearance of dilated cardiomyopathy. LVE w/stress & rest.    
 Contact dermatitis and other eczema, due to unspecified cause  Dilated cardiomyopathy ([address]  Goiter   
 or thyroid mass  Heart attack ([address]  Hypercholesterolemia  Hypertension  Mitral regurgitation  Mitral valve disorders(424.0)  Osteoarthritis of both knees  Pacemaker  S/P ICD (internal cardiac defibrillator) procedure  SOB (shortness of breath)  Thyroid disorder  Wears glasses Past Surgical History:  
Procedure Laterality Date  HX HEART CATHETERIZATION    
 HX PACEMAKER  6/2006 ICD implantation  HX PACEMAKER  11/09 Upgrade of ICD to biventricular ICD, with left lead implantation and generator change.  INITIAL ICD GENERATOR/LEAD EVAL Allergies Allergen Reactions  Phenobarbital Unknown (comments) Patient Active Problem List  
Diagnosis Code  Dilated cardiomyopathy, EF 35%. I42.0  Hypercholesterolemia E78.00  Mitral valve disorder I05.9  ICD implantation in June 2006/upgrade with left ventricular lead placement and generator change in November 2009. Z95.810  
 Osteoarthritis of both knees M17.0  AICD at end of battery life Z45.02  
 Sepsis ([address] A41.9  CVA (cerebral vascular accident) ([address] I63.9  
 NSTEMI (non-ST elevated myocardial infarction) ([address] I21.4  Elevated troponin R79.89  
 Pulmonary emboli (HCC) I26.99 Review of Systems:  
Unobtainable PHYSICAL EXAMINATION:   
 
  
Vital signs:   
Visit Vitals /65 (BP 1 Location: Right arm, BP Patient Position: At rest) Pulse 72 Temp 98.6 °F (37 °C) Resp 18 Wt 58.1 kg (128 lb) SpO2 97% Breastfeeding No  
BMI 21.30 kg/m² GENERAL:                  Well developed, obese, in no apparent distress. HEART:                       RR, no murmurs EXTREMITIES:           No edema is identified. Pulses are +2. HEAD:                         Normocephalic, atraumatic. NEUROLOGIC EXAMINATION 
 
  
MENTAL STATUS:     Lethargic but arousable when I call her name, she makes eye contact but she does not follow any simple commands. She does not verbalize. She does not respond when I ask her what is your name. CRANIAL NERVES:   Visual fields are full to confrontation. Pupils are reactive to light and accommodation. Unable to see fundi No gaze preference. Face is symmetrical.  
Hearing is present. The rest of the cranial nerves could not be properly assessed MOTOR:           No obvious signs of parents and in a very limited examination. CEREBELLAR:           No tremors SENSORY:      Unobtainable. DTR's:                          Trace throughout, no long tract signs GAIT:                           Deferred I have personally reviewed imaging studies. Recent Results (from the past 24 hour(s)) GLUCOSE, POC Collection Time: 03/14/20  3:57 PM  
Result Value Ref Range Glucose (POC) 99 70 - 110 mg/dL PTT Collection Time: 03/14/20  7:50 PM  
Result Value Ref Range aPTT 149.6 (HH) 23.0 - 36.4 SEC  
TROPONIN I Collection Time: 03/14/20  7:50 PM  
Result Value Ref Range Troponin-I, QT 1.11 (H) 0.0 - 0.045 NG/ML  
GLUCOSE, POC Collection Time: 03/14/20  9:04 PM  
Result Value Ref Range Glucose (POC) 104 70 - 110 mg/dL DRUG SCREEN, URINE  Collection Time: 03/15/20  2:25 AM  
 Result Value Ref Range BENZODIAZEPINES NEGATIVE  NEG    
 BARBITURATES NEGATIVE  NEG    
 THC (TH-CANNABINOL) NEGATIVE  NEG    
 OPIATES NEGATIVE  NEG    
 PCP(PHENCYCLIDINE) NEGATIVE  NEG    
 COCAINE NEGATIVE  NEG    
 AMPHETAMINES NEGATIVE  NEG METHADONE NEGATIVE  NEG HDSCOM (NOTE) URINALYSIS W/ RFLX MICROSCOPIC Collection Time: 03/15/20  2:25 AM  
Result Value Ref Range Color YELLOW Appearance CLOUDY Specific gravity >1.030 (H) 1.005 - 1.030  
 pH (UA) 5.0 5.0 - 8.0 Protein TRACE (A) NEG mg/dL Glucose NEGATIVE  NEG mg/dL Ketone NEGATIVE  NEG mg/dL Bilirubin NEGATIVE  NEG Blood LARGE (A) NEG Urobilinogen 1.0 0.2 - 1.0 EU/dL Nitrites POSITIVE (A) NEG Leukocyte Esterase MODERATE (A) NEG URINE MICROSCOPIC ONLY Collection Time: 03/15/20  2:25 AM  
Result Value Ref Range WBC 35 to 50 0 - 4 /hpf  
 RBC 6 to 8 0 - 5 /hpf Epithelial cells FEW 0 - 5 /lpf Bacteria 4+ (A) NEG /hpf Mucus 1+ (A) NEG /lpf PTT Collection Time: 03/15/20  5:49 AM  
Result Value Ref Range aPTT 66.5 (H) 23.0 - 36.4 SEC  
CBC WITH AUTOMATED DIFF Collection Time: 03/15/20  5:49 AM  
Result Value Ref Range WBC 7.3 4.6 - 13.2 K/uL  
 RBC 4.10 (L) 4.20 - 5.30 M/uL  
 HGB 11.8 (L) 12.0 - 16.0 g/dL HCT 36.7 35.0 - 45.0 % MCV 89.5 74.0 - 97.0 FL  
 MCH 28.8 24.0 - 34.0 PG  
 MCHC 32.2 31.0 - 37.0 g/dL  
 RDW 16.2 (H) 11.6 - 14.5 % PLATELET 263 555 - 906 K/uL MPV 12.9 (H) 9.2 - 11.8 FL  
 NEUTROPHILS 58 40 - 73 % LYMPHOCYTES 32 21 - 52 % MONOCYTES 7 3 - 10 % EOSINOPHILS 3 0 - 5 % BASOPHILS 0 0 - 2 %  
 ABS. NEUTROPHILS 4.2 1.8 - 8.0 K/UL  
 ABS. LYMPHOCYTES 2.3 0.9 - 3.6 K/UL  
 ABS. MONOCYTES 0.5 0.05 - 1.2 K/UL  
 ABS. EOSINOPHILS 0.2 0.0 - 0.4 K/UL  
 ABS. BASOPHILS 0.0 0.0 - 0.1 K/UL  
 DF AUTOMATED METABOLIC PANEL, BASIC Collection Time: 03/15/20  5:49 AM  
Result Value Ref Range  Sodium 138 136 - 145 mmol/L  
 Potassium 3.5 3.5 - 5.5 mmol/L Chloride 109 100 - 111 mmol/L  
 CO2 24 21 - 32 mmol/L Anion gap 5 3.0 - 18 mmol/L Glucose 106 (H) 74 - 99 mg/dL BUN 11 7.0 - 18 MG/DL Creatinine 0.60 0.6 - 1.3 MG/DL  
 BUN/Creatinine ratio 18 12 - 20 GFR est AA >60 >60 ml/min/1.73m2 GFR est non-AA >60 >60 ml/min/1.73m2 Calcium 8.4 (L) 8.5 - 10.1 MG/DL  
TROPONIN I Collection Time: 03/15/20  5:49 AM  
Result Value Ref Range Troponin-I, QT 0.81 (H) 0.0 - 0.045 NG/ML  
GLUCOSE, POC Collection Time: 03/15/20  6:30 AM  
Result Value Ref Range Glucose (POC) 108 70 - 110 mg/dL  
 
UA earlier this morning showed 35-50 WBCs with 4+ bacteria, large blood, and positive nitrites. Mliss Irene Impression: Patient with a pretty significant encephalopathy secondary to a florid urinary tract infection superimposed on a baseline dementia, presumably Alzheimer's, although I do not have the details of her prior evaluation leading to this specific diagnosis of dementia, poor at any rate presented with symptoms of left facial weakness, left arm weakness, and inability to speak that was termed\" aphasia\", which does not come together with a reported by radiology CT angio finding of an \"acute\" left M2/M3 distribution occlusion. Note only does an occlusion in this area not correlate with her clinical presentation, but I do not know how radiology can reach the conclusion that this is acute. I also reviewed personally the CT of the head read by a radiologist different than the one reading the CT angiogram and I do not see any evidence of acute ischemia nor does the radiologist reading the CT of the head. Therefore as aforementioned I think that this lady has an encephalopathy and present the radiographic findings that I suspect are incidental, likely chronic, and not correlating with the clinical presentation. Plan: 1Treat UTI.  
 2Consider repeat CT of the head at the 48-hour emile, which will be tomorrow, unless the patient has a marked improvement after antibiotic therapy. 3From the stroke management perspective she is on aspirin, atorvastatin 80 mg, so I do not have any additional recommendations for her. 4This lady is an 49-year-old female with pretty advanced cardiac disease, advanced dementia, recurrent admissions to the hospital, and it is expected that over the next year or 2 she is going to be exposed to a very high rate of morbidity and mortality and likely will spend the remaining period of her life going in and out of hospitals for what may be considered futile care. With this in mind I would recommend a palliative care consultation. 5No other acute neurological recommendations at this time. Please reconsult as needed. PLEASE NOTE:  
Portions of this document may have been produced using voice recognition software. Unrecognized errors in transcription may be present. This note will not be viewable in [address]

## 2020-03-15 NOTE — PROGRESS NOTES
PT orders received and chart reviewed. Patient pending LE duplex to r/o DVT, will follow up once results are known. Thank you. Magdy GERARDOT

## 2020-03-15 NOTE — PROGRESS NOTES
[facility] Hospitalist Group Progress Note Patient: Niru Estimable Age: 80 y.o. : 1932 MR#: [id number] SSN: [id number] Date/Time: 3/15/2020 Subjective:  
 
Pt seen & evaluated, lying in bed, NAD Unable to give any info , daughter at bedside Assessment/Plan: 1. Acute stroke- Acute superior M2 division LMCA occlusion 2. Elevated troponin- differential includes NSTEMI v. Demand ischemia - EKG is paced. Aphasia- cardiac hx not feasible. No cardiopulmonary complaints per family. 3. Concern for acute PE per CTA head neck 4. HTN 
5. HLD 6. Hx of Dilated cardiomyopathy s/p ICD implantation in 2006 then upgrade with left ventricular lead along with generator change on 2009 - suppose to have generator changed in 2020? 7. Moderate to severe mitral regurg 8. Hypothyroidism 9. Alzheimer's dementia 10. Impaired mobility- bedbound at baseline Acute CVA - Neurology on board -CTA head shows possible CVA - superior M2 trunk LMCA with evolving stroke at left insula. Continue current Rx - she is at high given her co morbid conditions & bed bound status. ? PE - per CTA head & neck - will do CTA chest in Am given recent administration of contrast  
UTI - IV Abx Duplex LE + for DVT - continue Heparin gtt Continue other Rx DNR Palliative care consult Case discussed with:  []Patient  []Family  []Nursing  []Case Management DVT Prophylaxis:  []Lovenox  []Hep SQ  []SCDs  []Coumadin   []On Heparin gtt Objective:  
VS:  
Visit Vitals /70 (BP 1 Location: Right arm, BP Patient Position: Supine) Pulse 81 Temp 99.2 °F (37.3 °C) Resp 18 Wt 58.1 kg (128 lb) SpO2 96% Breastfeeding No  
BMI 21.30 kg/m² Tmax/24hrs: Temp (24hrs), Av °F (36.7 °C), Min:97.4 °F (36.3 °C), Max:99.2 °F (37.3 °C) IOBRIEF Intake/Output Summary (Last 24 hours) at 3/15/2020 8923 Last data filed at 3/15/2020 3568 Gross per 24 hour Intake 18.32 ml Output 200 ml Net -181.68 ml General:  NAD, sleepy HEENT: PERRLA, anicteric sclerae. Pulmonary:  CTA Bilaterally. No Wheezing/Rhonchi/Rales. Cardiovascular: Regular rate and Rhythm. GI:  Soft, Non distended, Non tender. + Bowel sounds. Extremities:  No edema, cyanosis, clubbing. No calf tenderness. Neurologic: Unable to assess Additional: 
 
Medications:  
Current Facility-Administered Medications Medication Dose Route Frequency  aspirin (ASA) suppository 300 mg  300 mg Rectal DAILY  heparin 25,000 units in D5W 250 ml infusion  12-25 Units/kg/hr (Order-Specific) IntraVENous TITRATE  aspirin chewable tablet 81 mg  81 mg Oral DAILY  atorvastatin (LIPITOR) tablet 80 mg  80 mg Oral QHS  digoxin (LANOXIN) tablet 0.125 mg  0.125 mg Oral DAILY  levothyroxine (SYNTHROID) tablet 100 mcg  100 mcg Oral DAILY  acetaminophen (TYLENOL) suppository 325 mg  325 mg Rectal Q4H PRN  
 dextrose 5 % - 0.45% NaCl infusion  50 mL/hr IntraVENous CONTINUOUS  
 labetaloL (NORMODYNE;TRANDATE) 20 mg/4 mL (5 mg/mL) injection 10 mg  10 mg IntraVENous Q6H PRN Labs:   
Recent Results (from the past 48 hour(s)) METABOLIC PANEL, BASIC Collection Time: 03/14/20  9:48 AM  
Result Value Ref Range Sodium 143 136 - 145 mmol/L Potassium 4.1 3.5 - 5.5 mmol/L Chloride 111 100 - 111 mmol/L  
 CO2 28 21 - 32 mmol/L Anion gap 4 3.0 - 18 mmol/L Glucose 94 74 - 99 mg/dL BUN 13 7.0 - 18 MG/DL Creatinine 0.77 0.6 - 1.3 MG/DL  
 BUN/Creatinine ratio 17 12 - 20 GFR est AA >60 >60 ml/min/1.73m2 GFR est non-AA >60 >60 ml/min/1.73m2 Calcium 9.0 8.5 - 10.1 MG/DL  
CBC WITH AUTOMATED DIFF Collection Time: 03/14/20  9:48 AM  
Result Value Ref Range WBC 7.8 4.6 - 13.2 K/uL  
 RBC 4.26 4.20 - 5.30 M/uL  
 HGB 12.2 12.0 - 16.0 g/dL HCT 38.0 35.0 - 45.0 % MCV 89.2 74.0 - 97.0 FL  
 MCH 28.6 24.0 - 34.0 PG  
 MCHC 32.1 31.0 - 37.0 g/dL RDW 16.0 (H) 11.6 - 14.5 % PLATELET 189 716 - 095 K/uL MPV 12.4 (H) 9.2 - 11.8 FL  
 NEUTROPHILS 53 40 - 73 % LYMPHOCYTES 36 21 - 52 % MONOCYTES 9 3 - 10 % EOSINOPHILS 2 0 - 5 % BASOPHILS 0 0 - 2 %  
 ABS. NEUTROPHILS 4.1 1.8 - 8.0 K/UL  
 ABS. LYMPHOCYTES 2.8 0.9 - 3.6 K/UL  
 ABS. MONOCYTES 0.7 0.05 - 1.2 K/UL  
 ABS. EOSINOPHILS 0.2 0.0 - 0.4 K/UL  
 ABS. BASOPHILS 0.0 0.0 - 0.1 K/UL  
 DF AUTOMATED CARDIAC PANEL,(CK, CKMB & TROPONIN) Collection Time: 03/14/20  9:48 AM  
Result Value Ref Range CK 46 26 - 192 U/L  
 CK - MB <1.0 <3.6 ng/ml CK-MB Index  0.0 - 4.0 % CALCULATION NOT PERFORMED WHEN RESULT IS BELOW LINEAR LIMIT Troponin-I, QT 1.38 (H) 0.0 - 0.045 NG/ML  
PROTHROMBIN TIME + INR Collection Time: 03/14/20  9:48 AM  
Result Value Ref Range Prothrombin time 13.3 11.5 - 15.2 sec INR 1.0 0.8 - 1.2 PTT Collection Time: 03/14/20  9:48 AM  
Result Value Ref Range aPTT 26.9 23.0 - 36.4 SEC  
T3, FREE Collection Time: 03/14/20  9:48 AM  
Result Value Ref Range Triiodothyronine (T3), free 1.7 (L) 2.18 - 3.98 PG/ML  
T4, FREE Collection Time: 03/14/20  9:48 AM  
Result Value Ref Range T4, Free 1.1 0.7 - 1.5 NG/DL  
TSH 3RD GENERATION Collection Time: 03/14/20  9:48 AM  
Result Value Ref Range TSH 2.18 0.36 - 3.74 uIU/mL EKG, 12 LEAD, INITIAL Collection Time: 03/14/20 10:22 AM  
Result Value Ref Range Ventricular Rate 60 BPM  
 Atrial Rate 93 BPM  
 P-R Interval 158 ms QRS Duration 194 ms Q-T Interval 446 ms  
 QTC Calculation (Bezet) 446 ms Calculated P Axis 117 degrees Calculated R Axis -29 degrees Calculated T Axis 64 degrees Diagnosis AV dual-paced rhythm Abnormal ECG When compared with ECG of 02-MAY-2018 14:33, 
Vent. rate has decreased BY  25 BPM 
Confirmed by Zully Ribeiro ([id number]) on 3/14/2020 11:40:43 PM 
  
GLUCOSE, POC Collection Time: 03/14/20  3:57 PM  
Result Value Ref Range Glucose (POC) 99 70 - 110 mg/dL PTT Collection Time: 03/14/20  7:50 PM  
Result Value Ref Range aPTT 149.6 (HH) 23.0 - 36.4 SEC  
TROPONIN I Collection Time: 03/14/20  7:50 PM  
Result Value Ref Range Troponin-I, QT 1.11 (H) 0.0 - 0.045 NG/ML  
GLUCOSE, POC Collection Time: 03/14/20  9:04 PM  
Result Value Ref Range Glucose (POC) 104 70 - 110 mg/dL DRUG SCREEN, URINE Collection Time: 03/15/20  2:25 AM  
Result Value Ref Range BENZODIAZEPINES NEGATIVE  NEG    
 BARBITURATES NEGATIVE  NEG    
 THC (TH-CANNABINOL) NEGATIVE  NEG    
 OPIATES NEGATIVE  NEG    
 PCP(PHENCYCLIDINE) NEGATIVE  NEG    
 COCAINE NEGATIVE  NEG    
 AMPHETAMINES NEGATIVE  NEG METHADONE NEGATIVE  NEG HDSCOM (NOTE) URINALYSIS W/ RFLX MICROSCOPIC Collection Time: 03/15/20  2:25 AM  
Result Value Ref Range Color YELLOW Appearance CLOUDY Specific gravity >1.030 (H) 1.005 - 1.030  
 pH (UA) 5.0 5.0 - 8.0 Protein TRACE (A) NEG mg/dL Glucose NEGATIVE  NEG mg/dL Ketone NEGATIVE  NEG mg/dL Bilirubin NEGATIVE  NEG Blood LARGE (A) NEG Urobilinogen 1.0 0.2 - 1.0 EU/dL Nitrites POSITIVE (A) NEG Leukocyte Esterase MODERATE (A) NEG URINE MICROSCOPIC ONLY Collection Time: 03/15/20  2:25 AM  
Result Value Ref Range WBC 35 to 50 0 - 4 /hpf  
 RBC 6 to 8 0 - 5 /hpf Epithelial cells FEW 0 - 5 /lpf Bacteria 4+ (A) NEG /hpf Mucus 1+ (A) NEG /lpf PTT Collection Time: 03/15/20  5:49 AM  
Result Value Ref Range aPTT 66.5 (H) 23.0 - 36.4 SEC  
CBC WITH AUTOMATED DIFF Collection Time: 03/15/20  5:49 AM  
Result Value Ref Range WBC 7.3 4.6 - 13.2 K/uL  
 RBC 4.10 (L) 4.20 - 5.30 M/uL  
 HGB 11.8 (L) 12.0 - 16.0 g/dL HCT 36.7 35.0 - 45.0 % MCV 89.5 74.0 - 97.0 FL  
 MCH 28.8 24.0 - 34.0 PG  
 MCHC 32.2 31.0 - 37.0 g/dL  
 RDW 16.2 (H) 11.6 - 14.5 % PLATELET 283 072 - 022 K/uL MPV 12.9 (H) 9.2 - 11.8 FL  
 NEUTROPHILS 58 40 - 73 % LYMPHOCYTES 32 21 - 52 % MONOCYTES 7 3 - 10 % EOSINOPHILS 3 0 - 5 % BASOPHILS 0 0 - 2 %  
 ABS. NEUTROPHILS 4.2 1.8 - 8.0 K/UL  
 ABS. LYMPHOCYTES 2.3 0.9 - 3.6 K/UL  
 ABS. MONOCYTES 0.5 0.05 - 1.2 K/UL  
 ABS. EOSINOPHILS 0.2 0.0 - 0.4 K/UL  
 ABS. BASOPHILS 0.0 0.0 - 0.1 K/UL  
 DF AUTOMATED METABOLIC PANEL, BASIC Collection Time: 03/15/20  5:49 AM  
Result Value Ref Range Sodium 138 136 - 145 mmol/L Potassium 3.5 3.5 - 5.5 mmol/L Chloride 109 100 - 111 mmol/L  
 CO2 24 21 - 32 mmol/L Anion gap 5 3.0 - 18 mmol/L Glucose 106 (H) 74 - 99 mg/dL BUN 11 7.0 - 18 MG/DL Creatinine 0.60 0.6 - 1.3 MG/DL  
 BUN/Creatinine ratio 18 12 - 20 GFR est AA >60 >60 ml/min/1.73m2 GFR est non-AA >60 >60 ml/min/1.73m2 Calcium 8.4 (L) 8.5 - 10.1 MG/DL  
TROPONIN I Collection Time: 03/15/20  5:49 AM  
Result Value Ref Range Troponin-I, QT 0.81 (H) 0.0 - 0.045 NG/ML  
GLUCOSE, POC Collection Time: 03/15/20  6:30 AM  
Result Value Ref Range Glucose (POC) 108 70 - 110 mg/dL GLUCOSE, POC Collection Time: 03/15/20 11:52 AM  
Result Value Ref Range Glucose (POC) 128 (H) 70 - 110 mg/dL Signed By: Ameya Vallejo MD   
 March 15, 2020 Disclaimer: Sections of this note are dictated using utilizing voice recognition software. Minor typographical errors may be present. If questions arise, please do not hesitate to contact me or call our department.

## 2020-03-15 NOTE — PROGRESS NOTES
Bedside shift change report given to Rosa CHATMAN (oncoming nurse) by Siva Brewer (offgoing nurse). Report included the following information SBAR and Kardex.

## 2020-03-15 NOTE — PROGRESS NOTES
Problem: Dysphagia (Adult) Goal: *Acute Goals and Plan of Care (Insert Text) Description: Patient will: 1. Tolerate puree diet with thin liquids without overt s/sx of aspiration in 4/5 trials under SLP supervision. 2. Tolerate diet upgrade without overt s/sx of aspiration under SLP supervision. 3. Utilize compensatory swallow strategies of small bite/sip, alternate liquid/solid with min cues in 4/5 trials. 4. Perform oral-motor/laryngeal elevation exercises 10 reps/each to increase oropharyngeal swallow function with min cues. 5. Complete an objective swallow study (i.e., MBSS) to assess swallow integrity, r/o aspiration, and determine of safest LRD, min A. 
 
 
Rec:  
puree diet, thin liquids Aspiration precautions HOB >45 during po intake, remain >30 for 30-45 minutes after po Small bites/sips; alternate liquid/solid, slow feeding rate Oral care TID Meds with liquids Outcome: Progressing Towards Goal 
  
SPEECH LANGUAGE PATHOLOGY BEDSIDE SWALLOW EVALUATION Patient: Brooke Hopkins (71 y.o. female) Date: 3/15/2020 Primary Diagnosis: CVA (cerebral vascular accident) ([facility] [address] [I63.9] NSTEMI (non-ST elevated myocardial infarction) ([facility] [address] [I21.4] Precautions: Aspiration PLOF: Per H&P 
 
ASSESSMENT : 
Pt seen for swallow eval. Pt awake and alert, accepting of eval, nonverbal throughout eval. Upon SLP entry to room, pt continued to look at TV, turned off for increased attention. Family at bedside denies pt's difficulty swallowing, reporting regular diet prior to current admission. RN reports pt coughed with oral care; however, this SLP witnessed bed flatter than for PO trials. Oral examination reveals decreased lingual, labial strength and ROM, endentulous top dentition without dentures per dtr report. Pt given PO trials of thin liquid via single straw sips, puree, and regular textures.  Pt with scant R anterior spillage of thin liquid, improved with presentation of straw to L side of mouth. Pt with significantly delayed oral manipulation of puree and oral withholding of puree and regular textures; however, when straw presented for liquid wash, pt swallowed puree WFL without oral residuals. Mild lingual residuals of regular texture, cleared with liquid wash. Thin liquid taken [facility]. Decreased laryngeal elevation noted via palpation, no change in vocal/resp quality appreciated. Recommend puree diet with thin liquids, meds with liquids, aspiration precautions. Pt should be sitting as upright as possible for all oral care and PO intake. Pt and family at bedside educated on and verbalized understanding of findings, recs, and POC; d/w RN. SLP will follow per POC. Patient will benefit from skilled intervention to address the above impairments. Patient's rehabilitation potential is considered to be Good Factors which may influence rehabilitation potential include:  
[]            None noted [x]            Mental ability/status [x]            Medical condition []            Home/family situation and support systems 
[]            Safety awareness 
[]            Pain tolerance/management []            Other: PLAN : 
Recommendations and Planned Interventions: 
See above. Frequency/Duration: Patient will be followed by speech-language pathology 1-2 times per day/3-5 days per week to address goals. Discharge Recommendations: To Be Determined SUBJECTIVE:  
Patient did not verbalize throughout eval. 
 
OBJECTIVE:  
 
Past Medical History:  
Diagnosis Date Bursitis of both shoulders Cardiac echocardiogram, abnormal 07/01/2016 Definity enhanced. EF 20% (prev 35% on study of 9/5/12). Severe diffuse hypk. No mass. RVSP 41 mmHg. Severe LAE. Mod MR. Mod TR. Cardiac thallium stress test 01/07/2002 Mild anteroseptal scar w/o ongoing ischemia. Appearance of dilated cardiomyopathy.   LVE w/stress & rest.    
 Contact dermatitis and other eczema, due to unspecified cause Dilated cardiomyopathy ([address] Goiter   
 or thyroid mass Heart attack ([address] Hypercholesterolemia Hypertension Mitral regurgitation Mitral valve disorders(424.0) Osteoarthritis of both knees Pacemaker S/P ICD (internal cardiac defibrillator) procedure   
 SOB (shortness of breath) Thyroid disorder Wears glasses Past Surgical History:  
Procedure Laterality Date HX HEART CATHETERIZATION    
 HX PACEMAKER  6/2006 ICD implantation HX PACEMAKER  11/09 Upgrade of ICD to biventricular ICD, with left lead implantation and generator change. INITIAL ICD GENERATOR/LEAD EVAL Home Situation:  
Home Situation Home Environment: Private residence # Steps to Enter: 0 One/Two Story Residence: One story Living Alone: No 
Support Systems: Family member(s) Patient Expects to be Discharged to[de-identified] Private residence Current DME Used/Available at Home: Wheelchair Diet prior to admission: regular Current Diet:  puree/thin liquids Cognitive and Communication Status: 
Neurologic State: Alert Orientation Level: Unable to verbalize Cognition: Decreased attention/concentration, No command following Perception: Appears intact Perseveration: No perseveration noted Safety/Judgement: Fall prevention Oral Assessment: 
Oral Assessment Labial: Decreased rate;Decreased seal;Right droop Dentition: Natural;Limited;Edentulous Oral Hygiene: Adequate Lingual: Decreased rate;Decreased strength; Incoordinated Velum: No impairment Mandible: No impairment P.O. Trials: 
Patient Position: 75* HOB Vocal quality prior to P.O.: Aphonic; Other (comment)(did not attempt to talk) Consistency Presented: Thin liquid;Puree; Solid How Presented: SLP-fed/presented;Straw;Spoon Bolus Acceptance: No impairment Bolus Formation/Control: Impaired Type of Impairment: Delayed; Incomplete;Lip closure Propulsion: Delayed (# of seconds); Discoordination Oral Residue: 10-50% of bolus Initiation of Swallow: Delayed (# of seconds) Laryngeal Elevation: Decreased Aspiration Signs/Symptoms: None Pharyngeal Phase Characteristics: Easily fatigued ; Poor endurance Effective Modifications: Alternate liquids/solids;Small sips and bites Cues for Modifications: Moderate Oral Phase Severity: Mild-moderate Pharyngeal Phase Severity : No impairment PAIN: 
Pain level pre-treatment: 0/10 Pain level post-treatment: 0/10 Pain Intervention(s): NA  
Response to intervention: NA After treatment:  
[]            Patient left in no apparent distress sitting up in chair 
[x]            Patient left in no apparent distress in bed 
[]            Call bell left within reach [x]            Nursing notified 
[x]            Family present 
[]            Caregiver present 
[]            Bed alarm activated COMMUNICATION/EDUCATION:  
[x]            Aspiration precautions; swallow safety; compensatory techniques. []            Patient/family have participated as able in goal setting and plan of care. []            Patient/family agree to work toward stated goals and plan of care. []            Patient understands intent and goals of therapy; neutral about participation. []            Patient unable to participate in goal setting/plan of care; educ ongoing with interdisciplinary staff 
[]         Posted safety precautions in patient's room. Thank you for this referral. 
 
Helen Walker M.S., CCC-SLP Speech-Language Pathologist 
 
Time Calculation: 15 mins

## 2020-03-15 NOTE — ROUTINE PROCESS
Bedside and Verbal shift change report given to FLAKO Villalta RN (oncoming nurse) by UMA Ramsey RN (offgoing nurse). Report included the following information SBAR.

## 2020-03-15 NOTE — PROGRESS NOTES
Stroke Education provided to relative(s) and caregiver / friend and the following topics were discussed 1. Patients personal risk factors for stroke are hypertension and hyperlipidemia 2. Warning signs of Stroke: * Sudden numbness or weakness of the face, arm or leg, especially on one side of The body * Sudden confusion, trouble speaking or understanding * Sudden trouble seeing in one or both eyes * Sudden trouble walking, dizziness, loss of balance or coordination * Sudden severe headache with no known cause 3. Importance of activation Emergency Medical Services ( 9-1-1 ) immediately if experience any warning signs of stroke. 4. Be sure and schedule a follow-up appointment with your primary care doctor or any specialists as instructed. 5. You must take medicine every day to treat your risk factors for stroke. Be sure to take your medicines exactly as your doctor tells you: no more, no less. Know what your medicines are for , what they do. Anti-thrombotics /anticoagulants can help prevent strokes. You are taking the following medicine(s)  aspirin supp 6. Smoking and second-hand smoke greatly increase your risk of stroke, cardiovascular disease and death. Smoking history never 7. Information provided was [facility] Stroke Education [employer] 8. Documentation of teaching completed in Patient Education Activity and on Care Plan with teaching response noted? yes

## 2020-03-15 NOTE — CONSULTS
[facility]ists - Consult Note Consultation request by Dr. Brooks Yakima for advice/opinion related to evaluating elevated troponin Date of  Admission: 3/14/2020  9:49 AM  
Primary Care Physician:  Kierra Vance MD 
 
 Assessment:  
 
-Acute CVA. CTA head/neck 3/14/2020 revealed acute thromboembolic occlusion of the superior M2 trunk of the LMCA with questionable evolving stroke at the left insula. CTA also notes possible nonocclusive PE with extension into a small superior segmental branch. -Elevated troponin in setting of above, suggestive of demand ischemia. Peak troponin 1.38. 
-Dilated cardiomyopathy. Last Echo 07/2016 with EF 20%, severely dilated LA, moderate mitral + tricuspid regurgitation 
-S/p Medtronic Protecta AICD. Last generator change with implantation of LV lead in 11/2009. Last device interrogation 2/4/2020 revealed 4 months battery life remaining, V paced 98%, no events, OptiVol WNL. -HTN 
-Hypercholesterolemia 
-DNR status Primary cardiologist is Dr. Claudeen Alberts Plan:  
 
-Echocardiogram pending. Mildly elevated troponin likely reflective of demand ischemia in setting of acute CVA. -Will plan to interrogate device tomorrow. 
-Neurology and pulmonology consultation pending, assistance appreciated in advance. 
-No need for heparin from cardiac standpoint, although she does have evidence of thromboembolism of superior M2 trunk of LMCA and possible PE. Defer anticoagulation to neurology/pulmonary teams. History of Present Illness: This is a 80 y.o. female admitted for CVA (cerebral vascular accident) ([facility] [address] [I63.9] NSTEMI (non-ST elevated myocardial infarction) ([facility] [address] [I21.4]. Patient complains of:  Slurred speech, left-sided weakness Bertha Martell is a 80 y.o. female with PMHx as described above, who presented to the hospital due to slurred speech and left-sided weakness that was noticed yesterday morning.   Pt found to have acute CVA by CTA yesterday, findings as noted above. Cardiology was consulted due to elevated troponin. Pt somnolent, opens eyes, non-verbal and thus unable to provide additional history at this time. Cardiac risk factors: dyslipidemia, hypertension, known cardiomyopathy Review of Symptoms:   
Review of systems not obtained due to patient factors. Past Medical History:  
 
Past Medical History:  
Diagnosis Date  Bursitis of both shoulders  Cardiac echocardiogram, abnormal 2016 Definity enhanced. EF 20% (prev 35% on study of 12). Severe diffuse hypk. No mass. RVSP 41 mmHg. Severe LAE. Mod MR. Mod TR.  Cardiac thallium stress test 2002 Mild anteroseptal scar w/o ongoing ischemia. Appearance of dilated cardiomyopathy. LVE w/stress & rest.    
 Contact dermatitis and other eczema, due to unspecified cause  Dilated cardiomyopathy ([address]  Goiter   
 or thyroid mass  Heart attack ([address]  Hypercholesterolemia  Hypertension  Mitral regurgitation  Mitral valve disorders(424.0)  Osteoarthritis of both knees  Pacemaker  S/P ICD (internal cardiac defibrillator) procedure  SOB (shortness of breath)  Thyroid disorder  Wears glasses Social History:  
 
Social History Socioeconomic History  Marital status:  Spouse name: Not on file  Number of children: Not on file  Years of education: Not on file  Highest education level: Not on file Tobacco Use  Smoking status: Former Smoker Packs/day: 1.00 Last attempt to quit: 1998 Years since quittin.7  Smokeless tobacco: Never Used Substance and Sexual Activity  Alcohol use: No  
 Drug use: No  
 
 
 Family History:  
 
Family History Problem Relation Age of Onset  Diabetes Other  Heart Disease Other  Hypertension Other  Arthritis-osteo Other Medications: Allergies Allergen Reactions  Phenobarbital Unknown (comments) Current Facility-Administered Medications Medication Dose Route Frequency  aspirin (ASA) suppository 300 mg  300 mg Rectal DAILY  heparin 25,000 units in D5W 250 ml infusion  12-25 Units/kg/hr (Order-Specific) IntraVENous TITRATE  aspirin chewable tablet 81 mg  81 mg Oral DAILY  atorvastatin (LIPITOR) tablet 80 mg  80 mg Oral QHS  digoxin (LANOXIN) tablet 0.125 mg  0.125 mg Oral DAILY  levothyroxine (SYNTHROID) tablet 100 mcg  100 mcg Oral DAILY  acetaminophen (TYLENOL) suppository 325 mg  325 mg Rectal Q4H PRN  
 dextrose 5 % - 0.45% NaCl infusion  50 mL/hr IntraVENous CONTINUOUS  
 labetaloL (NORMODYNE;TRANDATE) 20 mg/4 mL (5 mg/mL) injection 10 mg  10 mg IntraVENous Q6H PRN Physical Exam:  
 
Visit Vitals /75 Pulse 63 Temp 97.4 °F (36.3 °C) Resp 18 Wt 128 lb (58.1 kg) SpO2 98% Breastfeeding No  
BMI 21.30 kg/m² BP Readings from Last 3 Encounters:  
03/15/20 122/75  
02/04/20 118/62  
06/03/19 106/66 Pulse Readings from Last 3 Encounters:  
03/15/20 63  
02/04/20 70  
06/03/19 66 Wt Readings from Last 3 Encounters:  
03/14/20 128 lb (58.1 kg) 12/03/18 128 lb (58.1 kg) 05/23/18 126 lb (57.2 kg) General:  Somnolent, opens eyes but does not respond verbally, no apparent distress, appears stated age Neck:  supple Lungs:  clear to auscultation bilaterally Heart:  Regular rate and rhythm Abdomen:  abdomen is soft without significant tenderness, masses, organomegaly or guarding Extremities:  Atraumatic, no edema Skin: Warm and dry. Neuro: opens eyes to voice but no verbal response, no involuntary movements Psych: unable to fully assess due to condition Data Review:  
 
Recent Labs  
  03/15/20 
0549 03/14/20 
6503 WBC 7.3 7.8 HGB 11.8* 12.2 HCT 36.7 38.0  
 159 Recent Labs  
  03/15/20 
0549 03/14/20 
5401  143  
K 3.5 4.1  111 CO2 24 28 * 94  
 BUN 11 13 CREA 0.60 0.77 CA 8.4* 9.0 INR  --  1.0 Results for orders placed or performed during the hospital encounter of 03/14/20 EKG, 12 LEAD, INITIAL Result Value Ref Range Ventricular Rate 60 BPM  
 Atrial Rate 93 BPM  
 P-R Interval 158 ms QRS Duration 194 ms Q-T Interval 446 ms  
 QTC Calculation (Bezet) 446 ms Calculated P Axis 117 degrees Calculated R Axis -29 degrees Calculated T Axis 64 degrees Diagnosis AV dual-paced rhythm Abnormal ECG When compared with ECG of 02-MAY-2018 14:33, 
Vent. rate has decreased BY  25 BPM 
Confirmed by Sarah Armendariz ([id number]) on 3/14/2020 11:40:43 PM 
  
Results for orders placed or performed in visit on 02/04/20 AMB POC EKG ROUTINE W/ 12 LEADS, INTER & REP Narrative EKG: unchanged from previous tracings, 1:1 Biv.pacing Results for orders placed or performed in visit on 01/07/15 PACEMAKER CHECK Impression No events. BIV pacing 99%. A sensed 88%. Lead impedance and 
threshold WNL. All Cardiac Markers in the last 24 hours:   
Lab Results Component Value Date/Time CPK 46 03/14/2020 09:48 AM  
 CKMB <1.0 03/14/2020 09:48 AM  
 CKND1  03/14/2020 09:48 AM  
  CALCULATION NOT PERFORMED WHEN RESULT IS BELOW LINEAR LIMIT  
 TROIQ 0.81 (H) 03/15/2020 05:49 AM  
 TROIQ 1.11 (H) 03/14/2020 07:50 PM  
 TROIQ 1.38 (H) 03/14/2020 09:48 AM  
 
 
Last Lipid:   
Lab Results Component Value Date/Time Cholesterol, total 180 07/01/2019 12:26 PM  
 HDL Cholesterol 40 07/01/2019 12:26 PM  
 LDL, calculated 104 (H) 07/01/2019 12:26 PM  
 Triglyceride 180 (H) 07/01/2019 12:26 PM  
 CHOL/HDL Ratio 4.5 07/01/2019 12:26 PM  
 
 
Signed By: Yayo Amador PA-C March 15, 2020

## 2020-03-16 NOTE — PROGRESS NOTES
Cardiovascular Specialists - Progress Note Admit Date: 3/14/2020 Assessment:  
 
-Acute CVA. CTA head/neck 3/14/2020 revealed acute thromboembolic occlusion of the superior M2 trunk of the LMCA with questionable evolving stroke at the left insula. CTA also notes possible nonocclusive PE with extension into a small superior segmental branch. -Elevated troponin in setting of above, suggestive of demand ischemia. Peak troponin 1.38. 
-Dilated cardiomyopathy. Last Echo 07/2016 with EF 20%, severely dilated LA, moderate mitral + tricuspid regurgitation 
-S/p Medtronic Protecta AICD. Last generator change with implantation of LV lead in 11/2009. Last device interrogation 2/4/2020 revealed 4 months battery life remaining, V paced 98%, no events, OptiVol WNL. -HTN 
-Hypercholesterolemia 
-DNR status 
  
Primary cardiologist is Dr. Marisol Weaver Plan:  
 
Await AICD device check, concern for thromboembolic occlusion of left MCA by CT. Heparin per primary, although from cardiac standpoint with increased troponin, not consistent with ACS. Follow-up echo. Subjective:  
 
Nonverbal, paced rhythm. Objective:  
  
Patient Vitals for the past 8 hrs: 
 Temp Pulse Resp BP SpO2  
03/16/20 1200 98.2 °F (36.8 °C) 72 23 120/76 99 % 03/16/20 0946    110/65   
03/16/20 0801 97.6 °F (36.4 °C) 78 24 128/74 95 % Patient Vitals for the past 96 hrs: 
 Weight 03/16/20 0946 58.1 kg (128 lb)  
03/14/20 1315 58.1 kg (128 lb) Intake/Output Summary (Last 24 hours) at 3/16/2020 1211 Last data filed at 3/16/2020 0804 Gross per 24 hour Intake 608.05 ml Output 200 ml Net 408.05 ml Physical Exam: 
General: nonverbal 
Neck:  nontender Lungs:  clear to auscultation bilaterally Heart:  regular rate and rhythm, S1, S2 normal, no murmur, click, rub or gallop Abdomen:  abdomen is soft without significant tenderness, masses, organomegaly or guarding Extremities:  extremities normal, atraumatic, no cyanosis or edema Data Review:  
 
Labs: Results:  
   
Chemistry Recent Labs  
  03/15/20 
0549 03/14/20 
6660 * 94  143  
K 3.5 4.1  111 CO2 24 28 BUN 11 13 CREA 0.60 0.77 CA 8.4* 9.0 AGAP 5 4 BUCR 18 17 CBC w/Diff Recent Labs  
  03/15/20 
0549 03/14/20 
2578 WBC 7.3 7.8  
RBC 4.10* 4.26  
HGB 11.8* 12.2 HCT 36.7 38.0  
 159 GRANS 58 53 LYMPH 32 36 EOS 3 2 Cardiac Enzymes No results found for: CPK, CK, CKMMB, CKMB, RCK3, CKMBT, CKNDX, CKND1, BETO, TROPT, TROIQ, KAMI, TROPT, TNIPOC, BNP, BNPP Coagulation Recent Labs  
  03/16/20 
0605 03/15/20 
2140  03/14/20 
6965 PTP  --   --   --  13.3 INR  --   --   --  1.0 APTT 108.6* 87.5*   < > 26.9  
 < > = values in this interval not displayed. Lipid Panel Lab Results Component Value Date/Time Cholesterol, total 180 07/01/2019 12:26 PM  
 HDL Cholesterol 40 07/01/2019 12:26 PM  
 LDL, calculated 104 (H) 07/01/2019 12:26 PM  
 VLDL, calculated 36 07/01/2019 12:26 PM  
 Triglyceride 180 (H) 07/01/2019 12:26 PM  
 CHOL/HDL Ratio 4.5 07/01/2019 12:26 PM  
  
BNP No results found for: BNP, BNPP, XBNPT Liver Enzymes No results for input(s): TP, ALB, TBIL, AP, SGOT, GPT in the last 72 hours. No lab exists for component: DBIL Digoxin Thyroid Studies Lab Results Component Value Date/Time TSH 2.18 03/14/2020 09:48 AM  
    
 
Signed By: Marquis Young MD   
 March 16, 2020

## 2020-03-16 NOTE — PROGRESS NOTES
Speech Therapy Note: 
  
Follow up attempted. Could not progress with ST intervention because patient:   
  
[x]  Lethargic, unable to be alerted enough for safe PO intake []  Refused participation 
[]  Off the unit 
[]  NPO for procedure 
[]  Other:  
  
Will continue to follow per POC. Sun Salazar M.S., CCC-SLP Speech-Language Pathologist

## 2020-03-16 NOTE — PALLIATIVE CARE
[address] [facility] in [facility] 
[phone] Patient Name: Faviola Garcia YOB: 1932 Age: 80 y.o. [address] NP Note:  Palliative consult noted, Chart reviewed, Plan of care reviewed with patients physician and medical team.  
 
Call to daughter to discuss patient goals of care based on his AMD  
 
Thank you for the opportunity to be of service to this patient. Mar Marie MSN, APRN, AGNP-C Palliative Medicine Department [place], [place]

## 2020-03-16 NOTE — PROGRESS NOTES
Bedside and Verbal shift change report given to [address] (oncoming nurse) by Julissa Martínez RN 
 (offgoing nurse). Report given with SBAR, Kardex, MAR and Recent Results.

## 2020-03-16 NOTE — PROGRESS NOTES
Problem: Mobility Impaired (Adult and Pediatric) Goal: *Acute Goals and Plan of Care (Insert Text) Description: Physical Therapy Goals Initiated 3/16/2020 and to be accomplished within 7 day(s) 1. Patient will move from supine to sit and sit to supine , scoot up and down and roll side to side in bed with moderate assistance x 1.    
2.  Patient will transfer from bed to chair and chair to bed with moderate assistance x 1 using the least restrictive device. 3.  Patient will perform sit to stand with maximum assistance x 1. PLOF: Pt unable to verbalize her PLOF, per chart review she was bedbound living with her daughter who needed a wheelchair to get around. Outcome: Progressing Towards Goal 
  
PHYSICAL THERAPY EVALUATION Patient: Ana Torres (4 y.o. female) Date: 3/16/2020 Primary Diagnosis: CVA (cerebral vascular accident) ([facility] [I63.9] NSTEMI (non-ST elevated myocardial infarction) ([facility] [address] [I21.4] Precautions: WBAT 
 
ASSESSMENT : 
Based on the objective data described below, the patient presents in bed, asleep. Pts session was performed as a co-tx with OT to maximize patient safety and mobility. Awakes slowly to verbal cues and sternal rub. Pt is unable to verbalize this date and is noted to respond to less than 50% of commands, she is also noted to lean to the R and have head/eyes fixed to the R and is able to turn head/eyes to the L with cues. Pts strength and ROM are generally decreased but functional. Pt is noted have pain in her facial expression with PROM (chart states she does not ambulate due to severe OA) to the BLEs and is noted to show some resistance. She is able to roll to both directions with mod A x 2 with verbal and visual cues as well as manual hand placement for bedrail use. Pt is able to perform supine <> sit x 1 with max A x 1 and with UE support x 3-4 min. Pt left up in bed with all needs met.  Pt is a good PT candidate in the acute setting to maximize functional mobility however will be placed on a 3 day trial to assess participation and appropriateness of skilled services due to decreased command following. Pt left in bed with all needs met and nursing aware of therapy session. At this time recommend [facility] PT with 24/7 supervision vs SNF pending progress. Patient will benefit from skilled intervention to address the above impairments. Patient's rehabilitation potential is considered to be Good Factors which may influence rehabilitation potential include:  
[]         None noted 
[x]         Mental ability/status []         Medical condition 
[]         Home/family situation and support systems 
[]         Safety awareness 
[]         Pain tolerance/management 
[]         Other: PLAN : 
Recommendations and Planned Interventions:  
[x]           Bed Mobility Training             [x]    Neuromuscular Re-Education 
[x]           Transfer Training                   []    Orthotic/Prosthetic Training 
[x]           Gait Training                          []    Modalities [x]           Therapeutic Exercises           []    Edema Management/Control 
[x]           Therapeutic Activities            [x]    Family Training/Education 
[x]           Patient Education 
[]           Other (comment): Frequency/Duration: Patient will be followed by physical therapy 1-2 times per day/4-7 days per week to address goals. Discharge Recommendations: Home Health 24/7 supervision vs SNF Further Equipment Recommendations for Discharge: TBD SUBJECTIVE:  
Patient unable to verbalize this date. OBJECTIVE DATA SUMMARY:  
 
Past Medical History:  
Diagnosis Date Bursitis of both shoulders Cardiac echocardiogram, abnormal 07/01/2016 Definity enhanced. EF 20% (prev 35% on study of 9/5/12). Severe diffuse hypk. No mass. RVSP 41 mmHg. Severe LAE. Mod MR. Mod TR. Cardiac thallium stress test 01/07/2002 Mild anteroseptal scar w/o ongoing ischemia. Appearance of dilated cardiomyopathy. LVE w/stress & rest.    
 Contact dermatitis and other eczema, due to unspecified cause Dilated cardiomyopathy ([address] Goiter   
 or thyroid mass Heart attack ([address] Hypercholesterolemia Hypertension Mitral regurgitation Mitral valve disorders(424.0) Osteoarthritis of both knees Pacemaker S/P ICD (internal cardiac defibrillator) procedure   
 SOB (shortness of breath) Thyroid disorder Wears glasses Past Surgical History:  
Procedure Laterality Date HX HEART CATHETERIZATION    
 HX PACEMAKER  6/2006 ICD implantation HX PACEMAKER  11/09 Upgrade of ICD to biventricular ICD, with left lead implantation and generator change. INITIAL ICD GENERATOR/LEAD EVAL Barriers to Learning/Limitations: yes;  altered mental status (i.e.Sedation, Confusion) Compensate with: Visual Cues, Verbal Cues, and Tactile Cues Home Situation: 
Home Situation Home Environment: Private residence # Steps to Enter: 0 One/Two Story Residence: One story Living Alone: No 
Support Systems: Family member(s) Patient Expects to be Discharged to[de-identified] Private residence Current DME Used/Available at Home: Wheelchair Critical Behavior: 
Neurologic State: Alert Orientation Level: Unable to verbalize Cognition: Decreased command following Safety/Judgement: Fall prevention Psychosocial 
Patient Behaviors: Altered mental status Strength:   
Strength: Generally decreased, functional 
 
  
Tone & Sensation:  
Tone: Normal 
  
   
Range Of Motion: 
AROM: Generally decreased, functional 
 
PROM: Within functional limits Functional Mobility: 
Bed Mobility: 
Rolling: Moderate assistance; Additional time;Assist x2 Supine to Sit: Maximum assistance Sit to Supine: Maximum assistance Scooting: Maximum assistance Balance:  
Sitting: Intact; With support Sitting - Static: Fair (occasional) Sitting - Dynamic: Fair (occasional) Pain: 
Pain level pre-treatment: unable to verbalize, pt face denotes pain to BLE PROM Pain level post-treatment: \"  \"  
Pain Intervention(s) : Medication (see MAR); Rest, Repositioning Response to intervention: Nurse notified Activity Tolerance:  
 
Please refer to the flowsheet for vital signs taken during this treatment. After treatment:  
[]         Patient left in no apparent distress sitting up in chair 
[x]         Patient left in no apparent distress in bed 
[x]         Call bell left within reach [x]         Nursing notified 
[]         Caregiver present 
[]         Bed alarm activated 
[]         SCDs applied COMMUNICATION/EDUCATION:  
[x]         Role of Physical Therapy in the acute care setting. [x]         Fall prevention education was provided and the patient/caregiver indicated understanding. [x]         Patient/family have participated as able in goal setting and plan of care. [x]         Patient/family agree to work toward stated goals and plan of care. []         Patient understands intent and goals of therapy, but is neutral about his/her participation. []         Patient is unable to participate in goal setting/plan of care: ongoing with therapy staff. 
[]         Other: Thank you for this referral. 
Danis Danielle Time Calculation: 23 mins Eval Complexity: History: MEDIUM  Complexity : 1-2 comorbidities / personal factors will impact the outcome/ POC Exam:MEDIUM Complexity : 3 Standardized tests and measures addressing body structure, function, activity limitation and / or participation in recreation  Presentation: MEDIUM Complexity : Evolving with changing characteristics  Clinical Decision Making:Medium Complexity    Overall Complexity:MEDIUM

## 2020-03-16 NOTE — PROGRESS NOTES
[facility] transport arranged for 9:30 am tomorrow to go home with [address]. Updated bedside RN, Ward Rowland and MD. BRITANY Ibarra, [facility]- [phone]

## 2020-03-16 NOTE — PROGRESS NOTES
Problem: Self Care Deficits Care Plan (Adult) Goal: *Acute Goals and Plan of Care (Insert Text) Description: Occupational Therapy Goals Initiated 3/16/2020 within 7 day(s). 1.  Patient will perform bed mobility with minimal assistance in preparation for further self-care. 2.  Patient will perform self-feeding with minimal assistance/contact guard assist. 
3.  Patient will perform grooming with minimal assistance/contact guard assist. 
4.  Patient will perform a functional activity sitting EOB presenting with G sitting balance for 5-8 minutes. 5.  Patient will participate in upper extremity therapeutic exercise/activities with minimal assistance for 5 minutes minutes. Prior Level of Function: Pt unable to clearly verbalize, therefore unable to receive PLOF. Per chart review, pt required assist for self-feeding and is bedbound. Outcome: Progressing Towards Goal 
 OCCUPATIONAL THERAPY EVALUATION Patient: Jaky Vaca (87 y.o. female) Date: 3/16/2020 Primary Diagnosis: CVA (cerebral vascular accident) ([facility] [address] [I63.9] NSTEMI (non-ST elevated myocardial infarction) ([facility] [address] [I21.4] Precautions: Aspiration ASSESSMENT : 
Pt cleared to participate in OT evaluation by RN. Pt seen in conjunction with PT to maximize safety of patient and staff members. Upon entering room, pt received semi-reclined in bed leaning towards R side. Pt lethargic, requiring sternal rub to wake. Pt agreeable to participate in therapy session. Based on the objective data described below, the patient presents with decreased strength, decreased sitting balance, decreased ability to safely perform functional transfers and mobility, decreased following of commands, and decreased independence in ADLs,  increasing the need for assistance from others. Pt mumbles, unable to clearly verbalize. Pt performed rolling in bed with mod assist x2 for repositioning.  In supine, pt required max/[place] assist for grooming task; verbal and tactile cueing provided. Pt performed supine<>sit with max assist in preparation for further self-care. Sitting EOB, noted pt leaning towards R side but is able to sit in upright position without assist. Therapist provided BUE PROM TherEx before returning pt to supine position in bed. The pt will benefit from a 3 day trial of skilled OT to assess her ability to participate and make progress toward goals. At the end of the session, pt left resting comfortably in bed, call bell in reach, with all needs met. Patient will benefit from skilled intervention to address the above impairments. Patient's rehabilitation potential is considered to be Good Factors which may influence rehabilitation potential include:  
[]             None noted [x]             Mental ability/status [x]             Medical condition [x]             Home/family situation and support systems [x]             Safety awareness [x]             Pain tolerance/management 
[]             Other: PLAN : 
Recommendations and Planned Interventions:  
[x]               Self Care Training                  [x]      Therapeutic Activities [x]               Functional Mobility Training   [x]      Cognitive Retraining 
[x]               Therapeutic Exercises           [x]      Endurance Activities [x]               Balance Training                    [x]      Neuromuscular Re-Education [x]               Visual/Perceptual Training     [x]      Home Safety Training 
[x]               Patient Education                   [x]      Family Training/Education []               Other (comment): Frequency/Duration: Patient will be followed by occupational therapy 1-2 times per day/4-7 days per week to address goals. Discharge Recommendations: Home health with 24/7 Supervision/Assist vs LTC Further Equipment Recommendations for Discharge: Wheelchair SUBJECTIVE:  
 Patient mumbles, unable to clearly verbalize. OBJECTIVE DATA SUMMARY:  
 
Past Medical History:  
Diagnosis Date Bursitis of both shoulders Cardiac echocardiogram, abnormal 07/01/2016 Definity enhanced. EF 20% (prev 35% on study of 9/5/12). Severe diffuse hypk. No mass. RVSP 41 mmHg. Severe LAE. Mod MR. Mod TR. Cardiac thallium stress test 01/07/2002 Mild anteroseptal scar w/o ongoing ischemia. Appearance of dilated cardiomyopathy. LVE w/stress & rest.    
 Contact dermatitis and other eczema, due to unspecified cause Dilated cardiomyopathy ([address] Goiter   
 or thyroid mass Heart attack ([address] Hypercholesterolemia Hypertension Mitral regurgitation Mitral valve disorders(424.0) Osteoarthritis of both knees Pacemaker S/P ICD (internal cardiac defibrillator) procedure   
 SOB (shortness of breath) Thyroid disorder Wears glasses Past Surgical History:  
Procedure Laterality Date HX HEART CATHETERIZATION    
 HX PACEMAKER  6/2006 ICD implantation HX PACEMAKER  11/09 Upgrade of ICD to biventricular ICD, with left lead implantation and generator change. INITIAL ICD GENERATOR/LEAD EVAL Barriers to Learning/Limitations: yes;  cognitive, sensory deficits-vision/hearing/speech, and altered mental status (i.e.Sedation, Confusion) Compensate with: visual, verbal, tactile, kinesthetic cues/model Home Situation:  
Home Situation Home Environment: Private residence # Steps to Enter: 0 One/Two Story Residence: One story Living Alone: No 
Support Systems: Family member(s) Patient Expects to be Discharged to[de-identified] Private residence Current DME Used/Available at Home: Wheelchair []  Right hand dominant   []  Left hand dominant Cognitive/Behavioral Status: 
Neurologic State: Alert Orientation Level: Unable to verbalize Cognition: Decreased command following Safety/Judgement: Fall prevention Skin: Visible skin appeared intact. Edema: None noted Vision/Perceptual:   
Pt is able to track Coordination: BUE Generally decreased, functional 
 
Balance: 
Sitting: Intact; With support Sitting - Static: Fair (occasional) Sitting - Dynamic: Fair (occasional) Strength: BUE Strength: Generally decreased, functional 
 
Tone & Sensation: BUE Tone: Normal 
 
 
Range of Motion: BUE 
AROM: Generally decreased, functional 
PROM: Within functional limits Functional Mobility and Transfers for ADLs: 
Bed Mobility: 
Rolling: Moderate assistance; Additional time;Assist x2 Supine to Sit: Maximum assistance Sit to Supine: Maximum assistance Scooting: Maximum assistance Transfers: 
Deferred. ADL Assessment:  
Feeding: Maximum assistance Oral Facial Hygiene/Grooming: Maximum assistance Bathing: Maximum assistance Upper Body Dressing: Maximum assistance Lower Body Dressing: Maximum assistance Toileting: Maximum assistance ADL Intervention: 
Grooming Grooming Assistance: Maximum assistance(hand over hand assist) Position Performed: (Supine with HOB elevated) Washing Face: Maximum assistance Washing Hands: Maximum assistance Lower Body Dressing Assistance Dressing Assistance: Total assistance(dependent) Socks: Total assistance (dependent) Position Performed: Supine Cognitive Retraining Safety/Judgement: Fall prevention Therapeutic Exercise: 
 
 
EXERCISE Sets Reps Active Active Assist  
Passive Self- assisted ROM Comments Shoulder horizontal abduction     [] [] [] [] Shoulder flexion  1  3 [] [] [x] [] RUE and LUE Shoulder extension     [] [] [] [] Bicep curls 1  5  [] [] [x] [] RUE; Did not perform on LUE dt IV Shoulder external rotation     [] [] [] [] Shoulder internal rotation     [] [] [] []    
Wrist flexion/extension 1  5 [] [] [x] []  RUE and LUE Chair pushups   [] [] [] [] In preparation for ADLs Pain: Pain level pre-treatment: 0/10 Pain level post-treatment: 0/10 Pain Intervention(s): Medication (see MAR); Rest, Ice, Repositioning Response to intervention: Nurse notified, See doc flow Activity Tolerance:  
Fair Please refer to the flowsheet for vital signs taken during this treatment. After treatment:  
[] Patient left in no apparent distress sitting up in chair 
[x] Patient left in no apparent distress in bed 
[x] Call bell left within reach [x] Nursing notified 
[] Caregiver present 
[] Bed alarm activated COMMUNICATION/EDUCATION:  
[x] Role of Occupational Therapy in the acute care setting 
[] Home safety education was provided and the patient/caregiver indicated understanding. [x] Patient/family have participated as able in goal setting and plan of care. [x] Patient/family agree to work toward stated goals and plan of care. [] Patient understands intent and goals of therapy, but is neutral about his/her participation. [] Patient is unable to participate in goal setting and plan of care. Thank you for this referral. 
Justina Cazares MS, OTR/L Time Calculation: 23 mins Eval Complexity: History: MEDIUM Complexity : Expanded review of history including physical, cognitive and psychosocial  history ; Examination: MEDIUM Complexity : 3-5 performance deficits relating to physical, cognitive , or psychosocial skils that result in activity limitations and / or participation restrictions; Decision Making:MEDIUM Complexity : Patient may present with comorbidities that affect occupational performnce. Miniml to moderate modification of tasks or assistance (eg, physical or verbal ) with assesment(s) is necessary to enable patient to complete evaluation

## 2020-03-16 NOTE — PROGRESS NOTES
Attempt to feed patient puree diet in [place] position patient  began coughing feedings stopped will continue to monitor any choking or aspiration will consult speech for further evaluation

## 2020-03-16 NOTE — PROGRESS NOTES
Spoke with pt's daughters Irish Ortega and Ganesh Love. Family interested in hospice at home ASAP. Liang with [facility] hospice to speak with family today and make arrangements for hospice to start ASAP, per family request. BRITANY Adrian, [facility]- [phone]

## 2020-03-16 NOTE — ROUTINE PROCESS
Pt's POA allow to come to talk with Md concerning Life changing decisions. The Md went to lobby to discuss care with two family members.

## 2020-03-16 NOTE — HOSPICE
Hospice referral received. Chart review in process. [facility]. If we can be of further assistance please contact [phone] Leetta Goltz, RN [place] [address] [facility], [address] 
[phone] Email: [email or web address]

## 2020-03-16 NOTE — PROGRESS NOTES
[facility] Hospitalist Group Progress Note Patient: Feli Patel Age: 80 y.o. : 1932 MR#: [id number] SSN: [id number] Date/Time: 3/16/2020 Subjective:  
 
Pt seen & evaluated, lying in bed, NAD Unable to give any info , daughter at bedside Assessment/Plan: 1. Acute stroke- Acute superior M2 division LMCA occlusion 2. Elevated troponin- differential includes NSTEMI v. Demand ischemia - EKG is paced. Aphasia- cardiac hx not feasible. No cardiopulmonary complaints per family. 3. Concern for acute PE per CTA head neck 4. HTN 
5. HLD 6. Hx of Dilated cardiomyopathy s/p ICD implantation in 2006 then upgrade with left ventricular lead along with generator change on 2009 - suppose to have generator changed in 2020? 7. Moderate to severe mitral regurg 8. Hypothyroidism 9. Alzheimer's dementia 10. Impaired mobility- bedbound at baseline Acute CVA - Neurology on board -CTA head shows possible CVA - superior M2 trunk LMCA with evolving stroke at left insula. Continue current Rx - she is at high given her co morbid conditions & bed bound status. Acute Metab encephalopathy 2y to UTI / Acute CVA ? PE - per CTA head & neck - Canceled VQ scan & will not do CTA chest since she has DVT & ? PE - will not change outcome Echo reviewed - EF 25-30% UTI - IV Abx, Urine Cx - GNR - await sensitivities Duplex LE + for DVT - continue Heparin gtt Failed swallow eval - discussed with daughter & Page Mutamairani - ok with comfort feeding & transition to hospice care - consult placed Continue other Rx DNR Palliative care consult & Hospice consulted Case discussed with:  []Patient  []Family  []Nursing  []Case Management DVT Prophylaxis:  []Lovenox  []Hep SQ  []SCDs  []Coumadin   []On Heparin gtt Objective:  
VS:  
Visit Vitals /76 (BP 1 Location: Right arm, BP Patient Position: At rest) Pulse 72 Temp 98.2 °F (36.8 °C) Resp 23 Ht 5' 5\" (1.651 m) Wt 58.1 kg (128 lb) SpO2 99% Breastfeeding No  
BMI 21.30 kg/m² Tmax/24hrs: Temp (24hrs), Av.6 °F (36.4 °C), Min:97.2 °F (36.2 °C), Max:98.2 °F (36.8 °C) IOBRIEF Intake/Output Summary (Last 24 hours) at 3/16/2020 1522 Last data filed at 3/16/2020 1104 Gross per 24 hour Intake 608.05 ml Output 200 ml Net 408.05 ml General:  NAD, sleepy HEENT: PERRLA, anicteric sclerae. Pulmonary:  CTA Bilaterally. No Wheezing/Rhonchi/Rales. Cardiovascular: Regular rate and Rhythm. GI:  Soft, Non distended, Non tender. + Bowel sounds. Extremities:  No edema, cyanosis, clubbing. No calf tenderness. Neurologic: Unable to assess Additional: 
 
Medications:  
Current Facility-Administered Medications Medication Dose Route Frequency  cefTRIAXone (ROCEPHIN) 1 g in sterile water (preservative free) 10 mL IV syringe  1 g IntraVENous Q24H  
 aspirin (ASA) suppository 300 mg  300 mg Rectal DAILY  heparin 25,000 units in D5W 250 ml infusion  12-25 Units/kg/hr (Order-Specific) IntraVENous TITRATE  aspirin chewable tablet 81 mg  81 mg Oral DAILY  atorvastatin (LIPITOR) tablet 80 mg  80 mg Oral QHS  digoxin (LANOXIN) tablet 0.125 mg  0.125 mg Oral DAILY  levothyroxine (SYNTHROID) tablet 100 mcg  100 mcg Oral DAILY  acetaminophen (TYLENOL) suppository 325 mg  325 mg Rectal Q4H PRN  
 dextrose 5 % - 0.45% NaCl infusion  50 mL/hr IntraVENous CONTINUOUS  
 labetaloL (NORMODYNE;TRANDATE) 20 mg/4 mL (5 mg/mL) injection 10 mg  10 mg IntraVENous Q6H PRN Labs:   
Recent Results (from the past 48 hour(s)) GLUCOSE, POC Collection Time: 20  3:57 PM  
Result Value Ref Range Glucose (POC) 99 70 - 110 mg/dL PTT Collection Time: 20  7:50 PM  
Result Value Ref Range aPTT 149.6 (HH) 23.0 - 36.4 SEC  
TROPONIN I Collection Time: 20  7:50 PM  
Result Value Ref Range Troponin-I, QT 1.11 (H) 0.0 - 0.045 NG/ML  
GLUCOSE, POC Collection Time: 03/14/20  9:04 PM  
Result Value Ref Range Glucose (POC) 104 70 - 110 mg/dL PTT Collection Time: 03/15/20 12:10 AM  
Result Value Ref Range aPTT 93.8 (H) 23.0 - 36.4 SEC DRUG SCREEN, URINE Collection Time: 03/15/20  2:25 AM  
Result Value Ref Range BENZODIAZEPINES NEGATIVE  NEG    
 BARBITURATES NEGATIVE  NEG    
 THC (TH-CANNABINOL) NEGATIVE  NEG    
 OPIATES NEGATIVE  NEG    
 PCP(PHENCYCLIDINE) NEGATIVE  NEG    
 COCAINE NEGATIVE  NEG    
 AMPHETAMINES NEGATIVE  NEG METHADONE NEGATIVE  NEG HDSCOM (NOTE) URINALYSIS W/ RFLX MICROSCOPIC Collection Time: 03/15/20  2:25 AM  
Result Value Ref Range Color YELLOW Appearance CLOUDY Specific gravity >1.030 (H) 1.005 - 1.030  
 pH (UA) 5.0 5.0 - 8.0 Protein TRACE (A) NEG mg/dL Glucose NEGATIVE  NEG mg/dL Ketone NEGATIVE  NEG mg/dL Bilirubin NEGATIVE  NEG Blood LARGE (A) NEG Urobilinogen 1.0 0.2 - 1.0 EU/dL Nitrites POSITIVE (A) NEG Leukocyte Esterase MODERATE (A) NEG    
CULTURE, URINE Collection Time: 03/15/20  2:25 AM  
Result Value Ref Range Special Requests: NO SPECIAL REQUESTS Culture result: >100,000 COLONIES/mL GRAM NEGATIVE RODS (A) Culture result: [id number] COLONIES/mL POSSIBLE 2ND GRAM NEGATIVE MARY (A) URINE MICROSCOPIC ONLY Collection Time: 03/15/20  2:25 AM  
Result Value Ref Range WBC 35 to 50 0 - 4 /hpf  
 RBC 6 to 8 0 - 5 /hpf Epithelial cells FEW 0 - 5 /lpf Bacteria 4+ (A) NEG /hpf Mucus 1+ (A) NEG /lpf PTT Collection Time: 03/15/20  5:49 AM  
Result Value Ref Range aPTT 66.5 (H) 23.0 - 36.4 SEC  
CBC WITH AUTOMATED DIFF Collection Time: 03/15/20  5:49 AM  
Result Value Ref Range WBC 7.3 4.6 - 13.2 K/uL  
 RBC 4.10 (L) 4.20 - 5.30 M/uL  
 HGB 11.8 (L) 12.0 - 16.0 g/dL HCT 36.7 35.0 - 45.0 %  MCV 89.5 74.0 - 97.0 FL  
 MCH 28.8 24.0 - 34.0 PG  
 MCHC 32.2 31.0 - 37.0 g/dL  
 RDW 16.2 (H) 11.6 - 14.5 % PLATELET 609 418 - 405 K/uL MPV 12.9 (H) 9.2 - 11.8 FL  
 NEUTROPHILS 58 40 - 73 % LYMPHOCYTES 32 21 - 52 % MONOCYTES 7 3 - 10 % EOSINOPHILS 3 0 - 5 % BASOPHILS 0 0 - 2 %  
 ABS. NEUTROPHILS 4.2 1.8 - 8.0 K/UL  
 ABS. LYMPHOCYTES 2.3 0.9 - 3.6 K/UL  
 ABS. MONOCYTES 0.5 0.05 - 1.2 K/UL  
 ABS. EOSINOPHILS 0.2 0.0 - 0.4 K/UL  
 ABS. BASOPHILS 0.0 0.0 - 0.1 K/UL  
 DF AUTOMATED METABOLIC PANEL, BASIC Collection Time: 03/15/20  5:49 AM  
Result Value Ref Range Sodium 138 136 - 145 mmol/L Potassium 3.5 3.5 - 5.5 mmol/L Chloride 109 100 - 111 mmol/L  
 CO2 24 21 - 32 mmol/L Anion gap 5 3.0 - 18 mmol/L Glucose 106 (H) 74 - 99 mg/dL BUN 11 7.0 - 18 MG/DL Creatinine 0.60 0.6 - 1.3 MG/DL  
 BUN/Creatinine ratio 18 12 - 20 GFR est AA >60 >60 ml/min/1.73m2 GFR est non-AA >60 >60 ml/min/1.73m2 Calcium 8.4 (L) 8.5 - 10.1 MG/DL  
TROPONIN I Collection Time: 03/15/20  5:49 AM  
Result Value Ref Range Troponin-I, QT 0.81 (H) 0.0 - 0.045 NG/ML  
GLUCOSE, POC Collection Time: 03/15/20  6:30 AM  
Result Value Ref Range Glucose (POC) 108 70 - 110 mg/dL GLUCOSE, POC Collection Time: 03/15/20 11:52 AM  
Result Value Ref Range Glucose (POC) 128 (H) 70 - 110 mg/dL PTT Collection Time: 03/15/20  1:12 PM  
Result Value Ref Range aPTT 66.3 (H) 23.0 - 36.4 SEC GLUCOSE, POC Collection Time: 03/15/20  3:35 PM  
Result Value Ref Range Glucose (POC) 134 (H) 70 - 110 mg/dL PTT Collection Time: 03/15/20  9:40 PM  
Result Value Ref Range aPTT 87.5 (H) 23.0 - 36.4 SEC  
PTT Collection Time: 03/16/20  6:05 AM  
Result Value Ref Range aPTT 108.6 (H) 23.0 - 36.4 SEC  
ECHO ADULT COMPLETE Collection Time: 03/16/20 10:29 AM  
Result Value Ref Range LA Volume 80.81 22 - 52 mL Ao Root D 2.95 cm  
 LA Vol Index 49.38 16 - 28 ml/m2  LVIDd 5.59 (A) 3.9 - 5.3 cm  
 LVPWd 0.88 0.6 - 0.9 cm LVIDs 5.44 cm IVSd 0.94 (A) 0.6 - 0.9 cm  
 LVOT d 2.07 cm  
 LVOT Peak Velocity 78.44 cm/s LVOT Peak Gradient 2.5 mmHg LVOT VTI 12.40 cm LA Vol 4C 83.66 (A) 22 - 52 mL  
 LA Vol 2C 57.37 (A) 22 - 52 mL  
 LA Area 4C 26.6 cm2 LV Mass .1 (A) 67 - 162 g  
 LV Mass AL Index 140.0 (A) 43 - 95 g/m2 Triscuspid Valve Regurgitation Peak Gradient 31.9 mmHg  
 TR Max Velocity 282.25 cm/s  
 LA Vol Index 35.06 16 - 28 ml/m2 LA Vol Index 51.13 16 - 28 ml/m2 Left Ventricular Fractional Shortening by 2D 3.3034362280 % Left Ventricular Outflow Tract Mean Gradient 7.8571011527 mmHg Left Ventricular Outflow Tract Mean Velocity 8.3654394528 cm/s Left Ventricular End Diastolic Volume by Teichholz Method 5.61638100354 mL Left Ventricular End Systolic Volume by Teichholz Method 9.8559436483 mL Left Ventricular Stroke Volume by Mesha Phoenix Method 4.1870862146 mL  
GLUCOSE, POC Collection Time: 03/16/20 11:52 AM  
Result Value Ref Range Glucose (POC) 106 70 - 110 mg/dL GLUCOSE, POC Collection Time: 03/16/20  3:07 PM  
Result Value Ref Range Glucose (POC) 104 70 - 110 mg/dL Signed By: Vito Rodriguez MD   
 March 16, 2020 Disclaimer: Sections of this note are dictated using utilizing voice recognition software. Minor typographical errors may be present. If questions arise, please do not hesitate to contact me or call our department.

## 2020-03-16 NOTE — PROGRESS NOTES
Bedside shift change report given to Dean Ceballos G.Rn (oncoming nurse) by Chet Wheeler (offgoing nurse). Report included the following information SBAR and Kardex.

## 2020-03-16 NOTE — PROGRESS NOTES
Reason for Admission:  CVA (cerebral vascular accident) ([facility] [I63.9] NSTEMI (non-ST elevated myocardial infarction) ([facility] [I21.4] RRAT Score:    12% Plan for utilizing home health:    hospice Likelihood of Readmission:   LOW Transition of Care Plan:         
 
 
Initial assessment completed with relative(s), Rola List and Stefani. Cognitive status of patient: disoriented. Face sheet information confirmed:  yes. The patient has a Doreen Knock, to participate in her discharge plan and to receive any needed information. This patient lives in a single family home with patient and daughter. Patient is not able to navigate steps as needed. Prior to hospitalization, patient was considered to be independent with ADLs/IADLS : no . If not independent,  patient needs assist with : dressing, bathing, food preparation, cooking, toileting, grooming and self feeding Patient has a current ACP document on file: yes The pt will need medical transport home upon discharge. The patient already has Abisai Cruz W/CARON, hospital bed, and  medical equipment available in the home. Patient is not currently active with home health. Patient has not stayed in a skilled nursing facility or rehab. This patient is on dialysis :no 
 Freedom of choice signed: no. Currently, the discharge plan is Hospice. The patient states that she can obtain her medications from the pharmacy, and take her medications as directed. Patient's current insurance is Medicare/[employer] Care Management Interventions PCP Verified by CM: Yes Mode of Transport at Discharge: BLS Transition of Care Consult (CM Consult): Home Hospice [facility]: Yes Current Support Network: Lives with Caregiver Confirm Follow Up Transport: Family Discharge Location Discharge Placement: Home with hospice BRITANY Leslie, [place]- [phone]

## 2020-03-16 NOTE — CDMP QUERY
After further study, do you concur with the findings of  encephalopathy  noted in the  neurology  Consultation note? 
 
 acute stroke  
            encephalopathy   only,  ( specify type )     
            stroke ruled out Other Explanation of the clinical findings Clinically Undetermined (no explanation for clinical findings) The medical record reflects the following clinical findings, risk factors and treatment:  
 
 
Risk Factors:    age bedbound status ;  UTI;  
  
Clinical Indicators:     weakness at baseline Treatment:- neuro consult  ; IV abx Thank you,    Goran Jacinto RN   CCDS  x [phone]

## 2020-03-16 NOTE — PROGRESS NOTES
Attempted bedside swallow prior to medication administration. Lipitor held this evening due to pt unable to swallow pudding. Will maintain pt NPO until speech re-evaluates.

## 2020-03-17 NOTE — ROUTINE PROCESS
Bedside and Verbal shift change report given to Benita Loo RN (oncoming nurse) by Sydney Taylor (offgoing nurse). Report included the following information SBAR, Kardex and Recent Results.

## 2020-03-17 NOTE — ROUTINE PROCESS
Bedside shift change report given to Michelle Willard (oncoming nurse) by Camille Couch (offgoing nurse). Report included the following information SBAR and Kardex.

## 2020-03-17 NOTE — DISCHARGE SUMMARY
Discharge Summary Patient: Mike Lopez MRN: [id number]  CSN: [id number] YOB: 1932  Age: 80 y.o. Sex: female DOA: 3/14/2020 LOS:  LOS: 3 days   Discharge Date:   
 
Admission Diagnoses: [facility] [I63.9] NSTEMI (non-ST elevated [facility] infarction) [facility] [I21.4] Discharge Diagnoses:   
Acute CVA  
NSTEMI  
HTN  
HLPD H/o Dilated CMO s/p AICD placement UTI HypoT4 Alzheimer's Dementia Bed Bound status Left leg DVT - common femoral, profunda femoral,left popliteal, posterior tibial. 
Chronic systolic heart failure Discharge Condition: Stable Consults: Neurology And Cardiology PHYSICAL EXAM 
Visit Vitals /57 Pulse 80 Temp 97.5 °F (36.4 °C) Resp 22 Ht 5' 5\" (1.651 m) Wt 58.1 kg (128 lb) SpO2 93% Breastfeeding No  
BMI 21.30 kg/m² General: NAD HEENT: PERRLA, EOMI. Anicteric sclerae. Lungs:  CTA Bilaterally. No Wheezing/Rhonchi/Rales. Heart:  Regular rate and Rhythm. Abdomen: Soft, Non distended, Non tender. + Bowel sounds. Extremities: No edema/ cyanosis/ clubbing Psych:   Good insight. Not anxious or agitated. Neurologic:  Sleepy but arousable HPI: 
Ms. Josselyn Hassan is an 81 yo AA female with a past medical hx of HTN, HLD, dilated CMO,  S/p ICD, hypothyroidism and Alzheimer's dementia who presented from home with slurred speech, left facial droop and left sided weakness at 815-830am this morning. All of the history is obtained from patient's daughter at the bedside Ms. Muriel Del Rosario as patient is aphasic. Ms. Muriel Del Rosario went into patient's bedroom this morning and found pt in bed, aphasic, with left sided facial droop, and bilateral upper extremity weakness. Patient was not tracking daughter with her eyes. Last known normal was 930pm last night. EMS was called.  Prior to this event, daughter states pt was in her usual state of health- no fever, chills, headaches, myalgias, chest pain, palpitations, SOB, KING, abd pain, n/v, diarrhea, constipation, dysuria.  
  
At baseline, patient is bedbound and needs wheelchair to get around. She has b/l knee arthritis which prevents her from ambulation. She can move both arms and legs at baseline. She can feed herself but needs assistance from her daughter for most day to day activities. At baseline ox3. conversant and coherent .  
  
Patient was evaluated by Frederick Sal in the ED. Not a tpa candidate because pt is outside of the window. Not a thrombectomy candidate because mRS =3. Recommended admission with MRI brain w/o contrast v. Repeat head CT in 24 hours, depends if her ICD is MRI compatible. Neuro Dr. Charlene Jhaveri was consulted and cleared for heparin administration. ED waiting to hear back from Cardiology.   
  
To note, her ICD was interrogated recently and demonstrated normal OptiVol. No significant ventricular or atrail arrhythmia per 's note.  
  
ED course: - Vital signs: temp is not documented, HR 60s, /63, RR 19, 98% on room air - Labs remarkable for: troponin 1.38  
- Imagin. Ct head wo contrast- no acute findings. 2. CTA head neck wo contrast : Acute superior M2 division LMCA occlusion.  
- EKG: AV dual paced rhythm - Patient received:   mg, heparin bolus and heparin drip 
  
- past medical hx- see above in HPI 
- no recent surgeries 
- allergies- PCN 
- med rec- I have reviewed all home meds with daughter at bedside. See below. No ASA at home  
- social hx- lives with daughter, bedbound, need wheelchair, denies recreational drugs. Denies etoh. Quit tobacco 30 yrs ago. - Specialists: CARDIOLOGY Dr. Haile Steve [facility] Course:  
Patient admitted to the hospital with aphasia, left-sided facial droop and bilateral upper extremity weakness. She underwent a CT head which was noted to be within normal limits.   CTA head and neck with and without contrast showed acute thromboembolic occlusion of superior M2 trunk of LMCA with evolving stroke the left insula. The patient was unable to do an MRI secondary to AICD placement which is noncompatible. Patient at baseline is bedbound and underwent a duplex of the lower extremity which showed acute left leg DVTs. She was started on a heparin drip. She was also noted to have a urinary tract infection on admission and was started on broad-spectrum IV antibiotics and has been switched to oral antibiotics on discharge. Per nursing evaluation patient was unable to swallow and was seen by speech therapy. Per speech therapy note patient was very drowsy and was unable to complete the test.  She was made n.p.o. however after my discussion with the family they wish to proceed with hospice. Hospice was consulted and the patient is currently being discharged under hospice care. Patient has a durable DNR in place. Imaging: CTA head and neck with and without contrast 
IMPRESSION: 
  
1. Acute thromboembolic occlusion of the superior M2 trunk of the LMCA with 
questionable evolving stroke at the left insula. 2.  Prominent soft tissue at the left aortopulmonary region which is 
questionably intraluminal along the superior wall of the left main pulmonary 
artery. Possible nonocclusive pulmonary embolus with extension into a small 
superior segmental branch. 3.  Partially visualized soft tissue prominence in the right hilum which is 
suspicious for lymphadenopathy. 
  
  
Findings of acute thromboembolus communicated to Dr. Christianne Vaelnzuela at 10:33 am 
3/14/20. Additional findings suspicious for PE again discussed with Dr. Christianne Valenzuela 
at [phone] hours. CT head IMPRESSION: Age-related atrophy and mild chronic appearing periventricular 
ischemic white matter change. No acute findings. 
  
Results relayed to Dr. Christianne Valenzuela on at 10:00 AM. Duplex lower extremity · Technically difficult and suboptimal exam due to patient noncooperation. · Acute thrombus present in the left common femoral vein. · Acute thrombus present in the left profunda femoral vein. · Acute thrombus present in the left femoral vein. · Acute thrombus present in the left popliteal vein. · Acute thrombus present in the left posterior tibial vein. Procedures:  
None Discharge Medications:    
Current Discharge Medication List  
  
START taking these medications Details  
apixaban (ELIQUIS) 5 mg tablet Take 1 Tab by mouth two (2) times a day. Qty: 60 Tab, Refills: 0  
  
ciprofloxacin HCl (Cipro) 500 mg tablet Take 1 Tab by mouth two (2) times a day. Qty: 10 Tab, Refills: 0 CONTINUE these medications which have CHANGED Details  
atorvastatin (LIPITOR) 80 mg tablet Take 1 Tab by mouth nightly. Qty: 30 Tab, Refills: 0 CONTINUE these medications which have NOT CHANGED Details  
spironolactone (ALDACTONE) 25 mg tablet TAKE 1 TABLET BY MOUTH ONCE DAILY Qty: 90 Tab, Refills: 3 LORazepam (ATIVAN) 0.5 mg tablet Take 1 Tab by mouth every four (4) hours as needed for Anxiety. Max Daily Amount: 3 mg. Qty: 15 Tab, Refills: 0  
  
digoxin (LANOXIN) 0.125 mg tablet Take 1 Tab by mouth daily. Qty: 30 Tab, Refills: 11 METOPROLOL SUCCINATE (TOPROL XL PO) Take 100 mg by mouth daily. FUROSEMIDE (LASIX PO) Take 40 mg by mouth daily. LEVOTHYROXINE SODIUM (SYNTHROID PO) Take 100 mcg by mouth daily. STOP taking these medications LORATADINE/PSEUDOEPHEDRINE SUL (CLARITIN-D 24 HOUR PO) Comments:  
Reason for Stopping:   
   
  
  
Current Facility-Administered Medications:  
  apixaban (ELIQUIS) tablet 5 mg, 5 mg, Oral, BID, Meenu Worthington MD, 5 mg at 03/16/20 1058   cefTRIAXone (ROCEPHIN) 1 g in sterile water (preservative free) 10 mL IV syringe, 1 g, IntraVENous, Q24H, Meenu Worthington MD, 1 g at 03/16/20 1412   aspirin chewable tablet 81 mg, 81 mg, Oral, DAILY, Nigel Bloch, Suong T, [place], Stopped at 03/15/20 0900 
  atorvastatin (LIPITOR) tablet 80 mg, 80 mg, Oral, QHS, Bradford Corbin, 80 mg at 03/16/20 2149   digoxin (LANOXIN) tablet 0.125 mg, 0.125 mg, Oral, DAILY, Nigel Bloch, Suong T, [place], Stopped at 03/15/20 0900   levothyroxine (SYNTHROID) tablet 100 mcg, 100 mcg, Oral, DAILY, Nigel Bloch, Suong T, [place], Stopped at 03/15/20 0900   acetaminophen (TYLENOL) suppository 325 mg, 325 mg, Rectal, Q4H PRN, Nigel Bloch, Suong T, [place] 
  dextrose 5 % - 0.45% NaCl infusion, 50 mL/hr, IntraVENous, CONTINUOUS, Bradford Corbin, Last Rate: 50 mL/hr at 03/16/20 1122, 50 mL/hr at 03/16/20 1122   labetaloL (NORMODYNE;TRANDATE) 20 mg/4 mL (5 mg/mL) injection 10 mg, 10 mg, IntraVENous, Q6H PRN, GARY Santana 
· It is important that you take the medication exactly as they are prescribed. · Keep your medication in the bottles provided by the pharmacist and keep a list of the medication names, dosages, and times to be taken in your wallet. · Do not take other medications without consulting your doctor. Minutes spent on discharge: 45 minutes spent coordinating this discharge (review instructions/follow-up, prescriptions, preparing report for sign off) Lynn Reyna MD 
3/17/2020 10:05 AM

## 2020-03-17 NOTE — HOSPICE
9832 Pt/family pursuing hospice:yes Admission dx approved by Hospice Medical Director: Acute stroke with aphasia and left sided weakness Anticipated hospital d/c date:3/17/2020 Discussion occurred with patient and/or family about preference of hospitalization once admitted to hospice: yes Reviewed and discussed hospice philosophy and keeping the patient comfortable in their residence: yes Discussion with patient/family regarding around the clock caregiver in place due to patient safety in the home once discharged: yes Pt has own DME - hospital bed, wc, and walker. Family denies any needs now but would like nurse to assess at admission. Transportation 3 hour window starting at 930 am 
 
Narrative of events and/or assessment if applicable:  
 
 
 
 
Spoke with Cristiana Marino and Samreen Sousa, patient's daughters Discussed [employer] philosophy, services, criteria, and IDT. Discussed caregiver need for round the clock care with Cristiana Sousa 
 primary caregiver identified as Cristiana Marino Caregiver concerns identified as n/a Answered all questions. Family wants patent home soon as possible. Gave brochure with 24/7 contact information. Thank you for the referral to [employer]. If we can be of further assistance please contact [phone]. Hanane Jeffries RN [place] [id number] [place], [facility] [address] [address] 
[phone] Email: [email or web address]

## 2020-03-17 NOTE — DISCHARGE INSTRUCTIONS
DISCHARGE SUMMARY from Nurse    PATIENT INSTRUCTIONS:    After general anesthesia or intravenous sedation, for 24 hours or while taking prescription Narcotics:  · Limit your activities  · Do not drive and operate hazardous machinery  · Do not make important personal or business decisions  · Do  not drink alcoholic beverages  · If you have not urinated within 8 hours after discharge, please contact your surgeon on call. Report the following to your surgeon:  · Excessive pain, swelling, redness or odor of or around the surgical area  · Temperature over 100.5  · Nausea and vomiting lasting longer than 4 hours or if unable to take medications  · Any signs of decreased circulation or nerve impairment to extremity: change in color, persistent  numbness, tingling, coldness or increase pain  · Any questions    What to do at Home:  Recommended activity: Activity as tolerated with hospice. If you experience any of the following symptoms dizziness, confusion, change in mental status or loss of consciousness  please follow up with hospice. *  Please give a list of your current medications to your Primary Care Provider. *  Please update this list whenever your medications are discontinued, doses are      changed, or new medications (including over-the-counter products) are added. *  Please carry medication information at all times in case of emergency situations. These are general instructions for a healthy lifestyle:    No smoking/ No tobacco products/ Avoid exposure to second hand smoke  Surgeon General's Warning:  Quitting smoking now greatly reduces serious risk to your health.     Obesity, smoking, and sedentary lifestyle greatly increases your risk for illness    A healthy diet, regular physical exercise & weight monitoring are important for maintaining a healthy lifestyle    You may be retaining fluid if you have a history of heart failure or if you experience any of the following symptoms:  Weight gain of 3 pounds or more overnight or 5 pounds in a week, increased swelling in our hands or feet or shortness of breath while lying flat in bed. Please call your doctor as soon as you notice any of these symptoms; do not wait until your next office visit. The discharge information has been reviewed with the care taker. The care taker verbalized understanding. Discharge medications reviewed with the patient and appropriate educational materials and side effects teaching were provided.   ___________________________________________________________________________________________________________________________________

## 2020-03-17 NOTE — PROGRESS NOTES
Report and discharge instructions given over telephone to [facility]. Marilia verbalized understanding.

## 2020-03-18 NOTE — ROUTINE PROCESS
As part of the discharge instructions, medications already given today were discussed with the patient. The next dose due of all ordered meds was highlighted as part of the medication discharge instructions. Discussed with the caregiver the importance of taking medications as directed, as well as the side effects and adverse reactions to medications ordered.

## 2020-03-30 NOTE — PROGRESS NOTES
I have personally seen and evaluated the device findings. Interrogation reviewed and I agree with assessment.     Viry Galindo
lab results/radiology results

## 2020-03-31 PROBLEM — Z51.5 HOSPICE CARE PATIENT: Status: ACTIVE | Noted: 2020-01-01

## 2021-04-09 NOTE — ED NOTES
Awake and alert. Denies any untoward symptoms at this time. Family at bedside.
I have reviewed discharge instructions with the patient. The patient verbalized understanding.
Statement Selected